# Patient Record
Sex: MALE | Race: WHITE | Employment: FULL TIME | URBAN - METROPOLITAN AREA
[De-identification: names, ages, dates, MRNs, and addresses within clinical notes are randomized per-mention and may not be internally consistent; named-entity substitution may affect disease eponyms.]

---

## 2018-10-17 ENCOUNTER — OFFICE VISIT (OUTPATIENT)
Dept: FAMILY MEDICINE | Facility: CLINIC | Age: 53
End: 2018-10-17
Payer: COMMERCIAL

## 2018-10-17 VITALS
SYSTOLIC BLOOD PRESSURE: 116 MMHG | TEMPERATURE: 97.9 F | RESPIRATION RATE: 16 BRPM | WEIGHT: 232 LBS | HEIGHT: 72 IN | HEART RATE: 68 BPM | BODY MASS INDEX: 31.42 KG/M2 | DIASTOLIC BLOOD PRESSURE: 88 MMHG

## 2018-10-17 DIAGNOSIS — K13.0 LIP LESION: ICD-10-CM

## 2018-10-17 DIAGNOSIS — Z00.00 WELL ADULT EXAM: Primary | ICD-10-CM

## 2018-10-17 LAB
CHOLEST SERPL-MCNC: 224 MG/DL
GLUCOSE SERPL-MCNC: 102 MG/DL (ref 70–99)
HDLC SERPL-MCNC: 35 MG/DL
LDLC SERPL CALC-MCNC: 157 MG/DL
NONHDLC SERPL-MCNC: 189 MG/DL
TRIGL SERPL-MCNC: 159 MG/DL

## 2018-10-17 PROCEDURE — 36415 COLL VENOUS BLD VENIPUNCTURE: CPT | Performed by: FAMILY MEDICINE

## 2018-10-17 PROCEDURE — 99386 PREV VISIT NEW AGE 40-64: CPT | Performed by: FAMILY MEDICINE

## 2018-10-17 PROCEDURE — 82947 ASSAY GLUCOSE BLOOD QUANT: CPT | Performed by: FAMILY MEDICINE

## 2018-10-17 PROCEDURE — 80061 LIPID PANEL: CPT | Performed by: FAMILY MEDICINE

## 2018-10-17 ASSESSMENT — ENCOUNTER SYMPTOMS
JOINT SWELLING: 0
MYALGIAS: 0
CONSTIPATION: 0
COUGH: 0
PALPITATIONS: 0
EYE PAIN: 0
HEMATURIA: 0
WEAKNESS: 0
HEMATOCHEZIA: 0
NAUSEA: 0
DIARRHEA: 0
FEVER: 0
PARESTHESIAS: 0
NERVOUS/ANXIOUS: 0
SHORTNESS OF BREATH: 0
SORE THROAT: 0
FREQUENCY: 0
ARTHRALGIAS: 0
DYSURIA: 0
HEARTBURN: 0
HEADACHES: 0
CHILLS: 0
ABDOMINAL PAIN: 0
DIZZINESS: 0

## 2018-10-17 ASSESSMENT — PAIN SCALES - GENERAL: PAINLEVEL: NO PAIN (0)

## 2018-10-17 NOTE — NURSING NOTE
Chief Complaint   Patient presents with     Physical       Initial /88 (BP Location: Right arm, Patient Position: Chair, Cuff Size: Adult Large)  Pulse 68  Temp 97.9  F (36.6  C) (Tympanic)  Resp 16  Ht 6' (1.829 m)  Wt 232 lb (105.2 kg)  BMI 31.46 kg/m2 Estimated body mass index is 31.46 kg/(m^2) as calculated from the following:    Height as of this encounter: 6' (1.829 m).    Weight as of this encounter: 232 lb (105.2 kg).    Patient presents to the clinic using No DME    Health Maintenance that is potentially due pending provider review:  Colonoscopy/FIT    Priscilla Perdomo MA  9:01 AM 10/17/2018  .

## 2018-10-17 NOTE — PROGRESS NOTES
SUBJECTIVE:   CC: Faustino Drew is an 52 year old male who presents for preventative health visit.     Physical   Annual:     Getting at least 3 servings of Calcium per day:  NO    Bi-annual eye exam:  NO    Dental care twice a year:  NO    Sleep apnea or symptoms of sleep apnea:  Excessive snoring    Diet:  Regular (no restrictions)    Frequency of exercise:  1 day/week    Duration of exercise:  15-30 minutes    Taking medications regularly:  Not Applicable    Additional concerns today:  No      Today's PHQ-2 Score:   PHQ-2 ( 1999 Pf[employer]) 10/17/2018   Q1: Little interest or pleasure in doing things 0   Q2: Feeling down, depressed or hopeless 0   PHQ-2 Score 0   Q1: Little interest or pleasure in doing things Not at all   Q2: Feeling down, depressed or hopeless Not at all   PHQ-2 Score 0       Abuse: Current or Past(Physical, Sexual or Emotional)- No  Do you feel safe in your environment - Yes    Social History   Substance Use Topics     Smoking status: Never Smoker     Smokeless tobacco: Not on file      Comment: Has chewed tobacco in the past.      Alcohol use Yes      Comment: 0ccasional     Alcohol Use 10/17/2018   If you drink alcohol do you typically have greater than 3 drinks per day OR greater than 7 drinks per week? No       Last PSA: No results found for: PSA    Reviewed orders with patient. Reviewed health maintenance and updated orders accordingly - Yes    Has lesion lower lip.  Dark purple, almost black.  6 months.  Non smoker, no tobacco.         Reviewed and updated as needed this visit by clinical staff         Reviewed and updated as needed this visit by Provider            Review of Systems   Constitutional: Negative for chills and fever.   HENT: Negative for congestion, ear pain, hearing loss and sore throat.    Eyes: Negative for pain and visual disturbance.   Respiratory: Negative for cough and shortness of breath.    Cardiovascular: Negative for chest pain, palpitations and peripheral edema.    Gastrointestinal: Negative for abdominal pain, constipation, diarrhea, heartburn, hematochezia and nausea.   Genitourinary: Negative for discharge, dysuria, frequency, genital sores, hematuria, impotence and urgency.   Musculoskeletal: Negative for arthralgias, joint swelling and myalgias.   Skin: Negative for rash.   Neurological: Negative for dizziness, weakness, headaches and paresthesias.   Psychiatric/Behavioral: Negative for mood changes. The patient is not nervous/anxious.          OBJECTIVE:   /88 (BP Location: Right arm, Patient Position: Chair, Cuff Size: Adult Large)  Pulse 68  Temp 97.9  F (36.6  C) (Tympanic)  Resp 16  Ht 6' (1.829 m)  Wt 232 lb (105.2 kg)  BMI 31.46 kg/m2    Physical Exam  Gen: AOx3, no acute distress  PSYCH: Affect WNL, logical thought and coherent speech   EYES: normal lids, conjunctiva.  Pupils normal. No periorbital swelling.  Neck:supple without lymphadenopathy or mass.    Throat: oroparynx clear, no exudate or tonsilar/palate asymmetry  Ears: normal TMs bilaterally.   CV: RRR, no murmur  Lungs: Clear bilaterally with good effort  Abd: nontender with no mass or organomegaly  Ext: no edema, moving all extremities  Neuro: gait normal, cranial nerves 2-12 grossly intact, symmetric strength, no sensory deficits noted  Skin: no rash .  Has 2mm dark lesion, round, monochromatic, lower L lip at border.  Doesn't suad.    GENITAL: No lesions on or around genitals.  No testicular mass, no spermatocele or epiditymits appreciated.  No hydrocele or varicocele.  No inguinal hernia.  Penis without drainage.        ASSESSMENT/PLAN:   Well exam  Lip lesion    Update labs.  Willing to do colonoscopy, info given.  Derm for lip, he doesn't like it and it's bothering him, see what they think.        Discussed risks and benefits of PSA screening with the patient, attempting to engage in shared decision making.  Discussed the high likelihood of further invasive evaluation if the PSA is  "elevated.  Discussed inability to reliably distinguish aggressive from indolent cancer on biopsy. Explained that PSA screeing and aggressive treatment for biopsy confirmed cancer may extend life in those patients harboring an aggressive form of prostate cancer, but will overtreat the overwhelming majority of men because of the high rate of indolent cancer.  We discussed that treatment of prostate cancer is not benign, and that significant side effects are a real possibility.  We discussed current USPSTF recommendations against any routine screening for prostate cancer.                 Given the above information, the patient elected not to procede with PSA screeing at this time.  He understands he can revisit the discussion at any time.       COUNSELING:   Reviewed preventive health counseling, as reflected in patient instructions       Regular exercise       Healthy diet/nutrition    BP Readings from Last 1 Encounters:   06/09/15 120/70     Estimated body mass index is 31.38 kg/(m^2) as calculated from the following:    Height as of 7/29/15: 5' 11\" (1.803 m).    Weight as of 7/29/15: 225 lb (102.1 kg).    BP Screening:   Last 3 BP Readings:    BP Readings from Last 3 Encounters:   10/17/18 116/88   06/09/15 120/70   05/28/15 125/76       The following was recommended to the patient:  Re-screen BP within a year and recommended lifestyle modifications  Weight management plan: diet/exercise       reports that he has never smoked. He does not have any smokeless tobacco history on file.          Gerber Drew MD  [facility]  "

## 2018-10-17 NOTE — MR AVS SNAPSHOT
After Visit Summary   10/17/2018    Faustino Derw    MRN: [id number]           Patient Information     Date Of Birth          1965        Visit Information        Provider Department      10/17/2018 8:40 AM Gerber Drew MD [facility]        Today's Diagnoses     Well adult exam    -  1    Lip lesion          Care Instructions      Preventive Health Recommendations  Male Ages 50 - 64    Yearly exam:             See your health care provider every year in order to  o   Review health changes.   o   Discuss preventive care.    o   Review your medicines if your doctor has prescribed any.     Have a cholesterol test every 5 years, or more frequently if you are at risk for high cholesterol/heart disease.     Have a diabetes test (fasting glucose) every three years. If you are at risk for diabetes, you should have this test more often.     Have a colonoscopy at age 50, or have a yearly FIT test (stool test). These exams will check for colon cancer.      Talk with your health care provider about whether or not a prostate cancer screening test (PSA) is right for you.    You should be tested each year for STDs (sexually transmitted diseases), if you re at risk.     Shots: Get a flu shot each year. Get a tetanus shot every 10 years.     Nutrition:    Eat at least 5 servings of fruits and vegetables daily.     Eat whole-grain bread, whole-wheat pasta and brown rice instead of white grains and rice.     Get adequate Calcium and Vitamin D.     Lifestyle    Exercise for at least 150 minutes a week (30 minutes a day, 5 days a week). This will help you control your weight and prevent disease.     Limit alcohol to one drink per day.     No smoking.     Wear sunscreen to prevent skin cancer.     See your dentist every six months for an exam and cleaning.     See your eye doctor every 1 to 2 years.            Follow-ups after your visit        Additional Services     DERMATOLOGY REFERRAL        Your provider has referred you to: [facility]: [facility] [phone]   [email or web address]    Please be aware that coverage of these services is subject to the terms and limitations of your health insurance plan.  Call member services at your health plan with any benefit or coverage questions.      Please bring the following with you to your appointment:    (1) Any X-Rays, CTs or MRIs which have been performed.  Contact the facility where they were done to arrange for  prior to your scheduled appointment.    (2) List of current medications  (3) This referral request   (4) Any documents/labs given to you for this referral            GASTROENTEROLOGY ADULT REF PROCEDURE [facility] [phone]       Last Lab Result: No results found for: CR  Body mass index is 31.46 kg/(m^2).     Needed:  No  Language:  English    Patient will be contacted to schedule procedure.     Please be aware that coverage of these services is subject to the terms and limitations of your health insurance plan.  Call member services at your health plan with any benefit or coverage questions.  Any procedures must be performed at a [facility] facility OR coordinated by your clinic's referral office.    Please bring the following with you to your appointment:    (1) Any X-Rays, CTs or MRIs which have been performed.  Contact the facility where they were done to arrange for  prior to your scheduled appointment.    (2) List of current medications   (3) This referral request   (4) Any documents/labs given to you for this referral                  Follow-up notes from your care team     Return in about 1 year (around 10/17/2019) for Routine Visit.      Who to contact     If you have questions or need follow up information about today's clinic visit or your schedule please contact [facility] directly at [phone].  Normal or non-critical lab and imaging  results will be communicated to you by MyChart, letter or phone within 4 business days after the clinic has received the results. If you do not hear from us within 7 days, please contact the clinic through MyChart or phone. If you have a critical or abnormal lab result, we will notify you by phone as soon as possible.  Submit refill requests through [employer] or call your pharmacy and they will forward the refill request to us. Please allow 3 business days for your refill to be completed.          Additional Information About Your Visit        Care EveryWhere ID     This is your Care EveryWhere ID. This could be used by other organizations to access your [facility] medical records  [id number]        Your Vitals Were     Pulse Temperature Respirations Height BMI (Body Mass Index)       68 97.9  F (36.6  C) (Tympanic) 16 6' (1.829 m) 31.46 kg/m2        Blood Pressure from Last 3 Encounters:   10/17/18 116/88   06/09/15 120/70   05/28/15 125/76    Weight from Last 3 Encounters:   10/17/18 232 lb (105.2 kg)   07/29/15 225 lb (102.1 kg)   07/01/15 225 lb (102.1 kg)              We Performed the Following     DERMATOLOGY REFERRAL     GASTROENTEROLOGY ADULT REF PROCEDURE ONLY [facility] [phone]     GLUCOSE     Lipid panel reflex to direct LDL Fasting        Primary Care Provider Office Phone # Fax #    Ned Huitron [phone] [address] [place], [place] [address]        Equal Access to Services     DERRELL [facility] Center[facility]OSTIN : Hadii rhianna Crocker, waaxda luleticiaadaha, qaybta kaalmada pat, waxay rell patel adejavi odonnell. So [facility] [place] [facility] [phone].    ATENCIÓN: Si habla español, tiene a jean disposición servicios gratuitos de asistencia lingüística. Llame al [phone].    We comply with applicable federal civil rights laws and [place] laws. We do not discriminate on the basis of race, color, national origin, age, disability, sex, sexual orientation, or gender identity.             Thank you!     Thank you for choosing [facility]  for your care. Our goal is always to provide you with excellent care. Hearing back from our patients is one way we can continue to improve our services. Please take a few minutes to complete the written survey that you may receive in the mail after your visit with us. Thank you!             Your Updated Medication List - Protect others around you: Learn how to safely use, store and throw away your medicines at www.disposemymeds.org.          This list is accurate as of 10/17/18  9:35 AM.  Always use your most recent med list.                   Brand Name Dispense Instructions for use Diagnosis    IBUPROFEN PO      Take 600 mg by mouth 3 times daily        Multi-vitamin Tabs tablet      Take 1 tablet by mouth daily

## 2019-06-11 ENCOUNTER — TELEPHONE (OUTPATIENT)
Dept: FAMILY MEDICINE | Facility: CLINIC | Age: 54
End: 2019-06-11

## 2019-06-11 NOTE — TELEPHONE ENCOUNTER
Panel Management Review        Composite cancer screening  Chart review shows that this patient is due/due soon for the following Colonoscopy  Summary:    Patient is due/failing the following:   COLONOSCOPY    Action needed:   Patient needs to complete a colonoscopy.     Type of outreach:    Phone, left message for patient to call back. Please give the patient a number to schedule a colonoscopy if he wishes to complete. The number is [phone]. Thank you.     Questions for provider review:    None                                                                                                                                    Krysta Hammond, cma

## 2021-05-27 NOTE — PROGRESS NOTES
Assessment & Plan     Hernia, epigastric  Suspected hernia, epigastric.  Patient had large bulge over epigastric region approximately 1 week ago after lifting something under a car, has improved since.  Small lump noted on exam, not painful.  Presume getting better.  No bowel concerns.  Discussed with patient monitoring and notifying provider if having any bowel concerns and will pursue ultrasound to evaluate.    Colon cancer screening  Screening, should be receiving a phone call to schedule.  - GASTROENTEROLOGY ADULT REF PROCEDURE ONLY; Future    COVID-19 virus IgG antibody test result unknown  Patient had Covid-like symptoms in January 2020, was never tested for Covid.  Was seen in clinic in [place], was tested for everything and was negative.  Would like to be tested for antibodies.  Will call with results.  - COVID-19 Ranjit RBD Siomara & Titer Reflex; Future             See Patient Instructions    Return in about 1 month (around 6/28/2021), or if symptoms worsen or fail to improve.    Earnestine Hernandez, SAHARA CNP  [facility]    Kristel Harmon is a 55 year old who presents for the following health issues     HPI      Chief Complaint   Patient presents with     Hernia     Blood Draw     ill in January and want to know if has COVID antibody     January 2020 was ill, fevers/chills, cough a little, lungs felt funny, body aches, headache, no loss of taste or smell      Concern - hernia  Onset: noticed 1 week ago  Description: upper abdominal pain and noticed lump when tightens muscle  Intensity: mild  Progression of Symptoms:  same  Accompanying Signs & Symptoms: 0  Previous history of similar problem: had hernia as a child age 14 but lower  Precipitating factors:        Worsened by: 0  Alleviating factors:        Improved by: 0  Therapies tried and outcome:  none     3 weeks to a month ago, was working under car and lifted and hurt, but didn't think anything of it.   Not painful.   Bowel  movement's normal/regular   Urinating fine   No abdominal surgeries   Seems like it's getting better already, over the last week       Review of Systems   Constitutional, HEENT, cardiovascular, pulmonary, gi and gu systems are negative, except as otherwise noted.      Objective    BP (!) 154/90   Pulse 80   Temp 97.4  F (36.3  C)   Resp 24   Wt 110 kg (242 lb 6.4 oz)   SpO2 96%   BMI 32.88 kg/m    Body mass index is 32.88 kg/m .  Physical Exam   GENERAL APPEARANCE: healthy, alert and no distress  RESP: lungs clear to auscultation - no rales, rhonchi or wheezes  CV: regular rates and rhythm, normal S1 S2, no S3 or S4 and no murmur, click or rub  ABDOMEN: soft, nontender, an approximate 3 cm x 2 cm soft lump consistent with hernia midline epigastric region without tenderness, without hepatosplenomegaly and bowel sounds normal  MS: extremities normal- no gross deformities noted  SKIN: no suspicious lesions or rashes  PSYCH: mentation appears normal and affect normal/bright    Diagnostic Test Results:  none

## 2021-05-28 ENCOUNTER — OFFICE VISIT (OUTPATIENT)
Dept: FAMILY MEDICINE | Facility: CLINIC | Age: 56
End: 2021-05-28
Payer: COMMERCIAL

## 2021-05-28 VITALS
RESPIRATION RATE: 24 BRPM | OXYGEN SATURATION: 96 % | BODY MASS INDEX: 32.88 KG/M2 | TEMPERATURE: 97.4 F | HEART RATE: 80 BPM | WEIGHT: 242.4 LBS | DIASTOLIC BLOOD PRESSURE: 90 MMHG | SYSTOLIC BLOOD PRESSURE: 154 MMHG

## 2021-05-28 DIAGNOSIS — Z01.84 COVID-19 VIRUS IGG ANTIBODY TEST RESULT UNKNOWN: ICD-10-CM

## 2021-05-28 DIAGNOSIS — Z12.11 COLON CANCER SCREENING: ICD-10-CM

## 2021-05-28 DIAGNOSIS — K43.9 HERNIA, EPIGASTRIC: Primary | ICD-10-CM

## 2021-05-28 PROCEDURE — 99213 OFFICE O/P EST LOW 20 MIN: CPT | Performed by: NURSE PRACTITIONER

## 2021-05-28 PROCEDURE — 36415 COLL VENOUS BLD VENIPUNCTURE: CPT | Performed by: NURSE PRACTITIONER

## 2021-05-28 PROCEDURE — 86769 SARS-COV-2 COVID-19 ANTIBODY: CPT | Performed by: NURSE PRACTITIONER

## 2021-05-28 NOTE — PATIENT INSTRUCTIONS
Hernia, epigastric  Suspected hernia, epigastric.  Patient had large bulge over epigastric region approximately 1 week ago after lifting something under a car, has improved since.  Small lump noted on exam, not painful.  Presume getting better.  No bowel concerns.  Discussed with patient monitoring and notifying provider if having any bowel concerns and will pursue ultrasound to evaluate.    Colon cancer screening  Screening, should be receiving a phone call to schedule.  - GASTROENTEROLOGY ADULT REF PROCEDURE ONLY; Future    COVID-19 virus IgG antibody test result unknown  Patient had Covid-like symptoms in January 2020, was never tested for Covid.  Was seen in clinic in [place], was tested for everything and was negative.  Would like to be tested for antibodies.  Will call with results.  - COVID-19 Ranjit RBD Siomara & Titer Reflex; Future

## 2021-05-29 LAB
SARS-COV-2 AB PNL SERPL IA: NEGATIVE
SARS-COV-2 IGG SERPL IA-ACNC: NORMAL

## 2022-05-12 ENCOUNTER — TELEPHONE (OUTPATIENT)
Dept: FAMILY MEDICINE | Facility: CLINIC | Age: 57
End: 2022-05-12

## 2022-05-12 ENCOUNTER — OFFICE VISIT (OUTPATIENT)
Dept: FAMILY MEDICINE | Facility: CLINIC | Age: 57
End: 2022-05-12
Payer: COMMERCIAL

## 2022-05-12 VITALS
HEART RATE: 70 BPM | SYSTOLIC BLOOD PRESSURE: 148 MMHG | OXYGEN SATURATION: 95 % | DIASTOLIC BLOOD PRESSURE: 90 MMHG | BODY MASS INDEX: 32.9 KG/M2 | HEIGHT: 71 IN | RESPIRATION RATE: 16 BRPM | WEIGHT: 235 LBS | TEMPERATURE: 97.5 F

## 2022-05-12 DIAGNOSIS — E11.9 TYPE 2 DIABETES MELLITUS WITHOUT COMPLICATION, WITHOUT LONG-TERM CURRENT USE OF INSULIN (H): ICD-10-CM

## 2022-05-12 DIAGNOSIS — R74.8 ELEVATED LIVER ENZYMES: ICD-10-CM

## 2022-05-12 DIAGNOSIS — I10 BENIGN ESSENTIAL HYPERTENSION: ICD-10-CM

## 2022-05-12 DIAGNOSIS — R25.1 TREMOR: Primary | ICD-10-CM

## 2022-05-12 LAB
ALBUMIN SERPL-MCNC: 4.1 G/DL (ref 3.4–5)
ALP SERPL-CCNC: 87 U/L (ref 40–150)
ALT SERPL W P-5'-P-CCNC: 117 U/L (ref 0–70)
ANION GAP SERPL CALCULATED.3IONS-SCNC: 7 MMOL/L (ref 3–14)
AST SERPL W P-5'-P-CCNC: 60 U/L (ref 0–45)
BILIRUB SERPL-MCNC: 0.8 MG/DL (ref 0.2–1.3)
BUN SERPL-MCNC: 15 MG/DL (ref 7–30)
CALCIUM SERPL-MCNC: 9.7 MG/DL (ref 8.5–10.1)
CHLORIDE BLD-SCNC: 102 MMOL/L (ref 94–109)
CO2 SERPL-SCNC: 26 MMOL/L (ref 20–32)
CREAT SERPL-MCNC: 0.88 MG/DL (ref 0.66–1.25)
ERYTHROCYTE [DISTWIDTH] IN BLOOD BY AUTOMATED COUNT: 12 % (ref 10–15)
GFR SERPL CREATININE-BSD FRML MDRD: >90 ML/MIN/1.73M2
GLUCOSE BLD-MCNC: 219 MG/DL (ref 70–99)
HBA1C MFR BLD: 10.1 % (ref 0–5.6)
HCT VFR BLD AUTO: 48.2 % (ref 40–53)
HGB BLD-MCNC: 16.2 G/DL (ref 13.3–17.7)
MAGNESIUM SERPL-MCNC: 2.2 MG/DL (ref 1.6–2.3)
MCH RBC QN AUTO: 29 PG (ref 26.5–33)
MCHC RBC AUTO-ENTMCNC: 33.6 G/DL (ref 31.5–36.5)
MCV RBC AUTO: 86 FL (ref 78–100)
PLATELET # BLD AUTO: 236 10E3/UL (ref 150–450)
POTASSIUM BLD-SCNC: 4.1 MMOL/L (ref 3.4–5.3)
PROT SERPL-MCNC: 8.2 G/DL (ref 6.8–8.8)
RBC # BLD AUTO: 5.58 10E6/UL (ref 4.4–5.9)
SODIUM SERPL-SCNC: 135 MMOL/L (ref 133–144)
TSH SERPL DL<=0.005 MIU/L-ACNC: 2.02 MU/L (ref 0.4–4)
WBC # BLD AUTO: 7.5 10E3/UL (ref 4–11)

## 2022-05-12 PROCEDURE — 99214 OFFICE O/P EST MOD 30 MIN: CPT | Performed by: NURSE PRACTITIONER

## 2022-05-12 PROCEDURE — 36415 COLL VENOUS BLD VENIPUNCTURE: CPT | Performed by: NURSE PRACTITIONER

## 2022-05-12 PROCEDURE — 85027 COMPLETE CBC AUTOMATED: CPT | Performed by: NURSE PRACTITIONER

## 2022-05-12 PROCEDURE — 80053 COMPREHEN METABOLIC PANEL: CPT | Performed by: NURSE PRACTITIONER

## 2022-05-12 PROCEDURE — 83036 HEMOGLOBIN GLYCOSYLATED A1C: CPT | Performed by: NURSE PRACTITIONER

## 2022-05-12 PROCEDURE — 83735 ASSAY OF MAGNESIUM: CPT | Performed by: NURSE PRACTITIONER

## 2022-05-12 PROCEDURE — 84443 ASSAY THYROID STIM HORMONE: CPT | Performed by: NURSE PRACTITIONER

## 2022-05-12 RX ORDER — METFORMIN HCL 500 MG
TABLET, EXTENDED RELEASE 24 HR ORAL
Qty: 120 TABLET | Refills: 3 | Status: SHIPPED | OUTPATIENT
Start: 2022-05-12 | End: 2023-02-15

## 2022-05-12 ASSESSMENT — PATIENT HEALTH QUESTIONNAIRE - PHQ9
SUM OF ALL RESPONSES TO PHQ QUESTIONS 1-9: 0
SUM OF ALL RESPONSES TO PHQ QUESTIONS 1-9: 0

## 2022-05-12 NOTE — PROGRESS NOTES
"  Assessment & Plan     Tremor  Left hand. This does occur at rest, this is observed in clinic today. He feels like it does improve with action. He does feel like it also occurs to the left leg. No other symptoms such as ataxia, gait difficulties. No concerning family history. Ongoing for at least a year with no significant worsening. Will refer to neuro for further evaluation.   - CBC with platelets; Future  - Comprehensive metabolic panel (BMP + Alb, Alk Phos, ALT, AST, Total. Bili, TP); Future  - Magnesium; Future  - Adult Neurology  Referral; Future  - TSH with free T4 reflex; Future      Type 2 diabetes mellitus without complication, without long-term current use of insulin (H)  Elevated glucose on CMP, A1c added and found to be 10.1%. This is a new diagnosis of diabetes. Will start metformin. Referred to diabetes education. Follow up with PCP in 3 months for recheck.   - Hemoglobin A1c; Future  - metFORMIN (GLUCOPHAGE XR) 500 MG 24 hr tablet; Take 1 tab with dinner for one week, then 1 tab twice daily for one week, then 1 tab in the morning and 2 tabs with dinner for one week, then 2 tabs in the morning and 2 tabs with dinner.  - [facility] Adult Diabetes Educator Referral; Future    Benign essential hypertension  Elevated today, elevated at visit 1 year ago. Discussed lifestyle modification at length during visit to include exercise, weight loss, sodium restriction. He is resistant to starting medication today even in light of discussion of elevated risk of heart attack and stroke. He is advised to obtain a blood pressure cuff and monitor at home, and if persistently elevated over next month, should return to clinic for treatment.    Elevated liver enyzmes  Mild elevation incidentally noted on CMP today, advised to stop ETOH, acetaminophen use, recheck in a month.       BMI:   Estimated body mass index is 32.78 kg/m  as calculated from the following:    Height as of this encounter: 1.803 m (5' 11\").    " Weight as of this encounter: 106.6 kg (235 lb).   Weight management plan: Discussed healthy diet and exercise guidelines    Patient Instructions   Labs today.  Get scheduled with neurology.    Get a blood pressure cuff and monitor your blood pressure several times per week. If staying above 140 on the top, let me know. We may need to start blood pressure medicine.    Lifestyle changes that can lower blood pressure include:  Limiting sodium in the diet.  Goal of <2.3 grams (2300 mg) daily.  Avoiding salty and prepared foods and not adding salt at the table can help.   Regular moderate exercise at least 150 minutes per week (30 minutes per day 5 days per week) plus muscle strengthening exercise at least 2 days per week.   Weight loss can help even if not dramatic or down to normal BMI range.  DASH type diet can actually lower blood pressure. You can find multiple resources for this on the internet.  Cutting back on alcohol can help for women drinking more than a drink per day and men more than 2 drinks per day on average.   Quitting smoking can help reduce your risk of heart attack or stroke.    A good resource for lifestyle changes is https://www.cardiosmart.org/topics/healthy-living        Return in about 1 month (around 6/12/2022) for worsening or continued symptoms.    SAHARA Malhotra CNP  [facility]    Kristel Harmon is a 56 year old who presents for the following health issues      Concern - Tremors   Onset: more than a month, pt states it started around the time of getting pfizer vaccine in 07/27/21.   Description: Left arm and left thigh tremor's   Intensity: severe  Progression of Symptoms:  same  Accompanying Signs & Symptoms: no  Previous history of similar problem: no  Precipitating factors:        Worsened by: na  Alleviating factors:        Improved by: na  Therapies tried and outcome:  none     Above HPI reviewed. Additionally, tremor to left hand has been present  "for the past year. Also notes has had this to the left upper leg as well. This occurs at rest, he thinks that when he is using his arm this does mostly resolve. He doesn't think this has significantly worsened over the past year. He denies any weakness, difficulty with fine or gross motor skills, ataxia, aphasia, dysphasia. No family history of tremor.       Review of Systems   Constitutional, HEENT, cardiovascular, pulmonary, gi and gu systems are negative, except as otherwise noted.      Objective    BP (!) 148/90   Pulse 70   Temp 97.5  F (36.4  C) (Tympanic)   Resp 16   Ht 1.803 m (5' 11\")   Wt 106.6 kg (235 lb)   SpO2 95%   BMI 32.78 kg/m    Body mass index is 32.78 kg/m .  Physical Exam   General Appearance:  Alert, cooperative, no distress, appears stated age. Afebrile.  Integument: Warm, dry, no rashes or lesions.  HEENT:  Head: Atraumatic, normocephalic. No cranial bone tenderness. Face nontraumatic.  Eyes: Conjunctiva clear, Lids normal. PERRL.   Nose: nares patent. No erythema of nasal mucosa. No rhinorrhea.  Neck: Supple. No Cspine tenderness.  Respiratory: No distress. Lungs clear to ausculation bilaterally. No crackles, wheezes, rhonchi or stridor.  Cardiovascular:: Regular rate, regular rhythm. No murmurs, rubs, clicks or gallops. No obvious chest wall deformities. Peripheral pulses 2+ bilaterally. No peripheral edema.  GI: Soft, nontender, normal bowel sounds. No masses, organomegaly, rigidity, or guarding.  Musculoskeletal: No swelling or erythema of extremities. Moves all extremities equally. Back: no Tspine or Lspine tenderness.   Neurologic: Alert & oriented to person, place and time. Normal tone. PERRL. Normal speech, no dysarthria.  CN II-XII grossly intact. Motor: RUE/LUE  strength 5/5 bilaterally, elbow flexion/extension 5/5 bilaterally, shoulder elevation 5/5 bilaterally. RLE/LLE knee flexion/extension 5/5 bilaterally, ankle plantar/dorsiflexion 5/5 bilaterally. No pronator drift. " Sensory: intact distally  Gait: Normal, no assistive devices. Assistance of one. Psychomotor slowing (-). Abnormal Movements (-).Rapid alternating Movements intact. Finger nose finger intact. Heel to shin normal bilaterally.  Psych: Normal mood and affect.      Results for orders placed or performed in visit on 05/12/22 (from the past 24 hour(s))   Magnesium   Result Value Ref Range    Magnesium 2.2 1.6 - 2.3 mg/dL   Comprehensive metabolic panel (BMP + Alb, Alk Phos, ALT, AST, Total. Bili, TP)   Result Value Ref Range    Sodium 135 133 - 144 mmol/L    Potassium 4.1 3.4 - 5.3 mmol/L    Chloride 102 94 - 109 mmol/L    Carbon Dioxide (CO2) 26 20 - 32 mmol/L    Anion Gap 7 3 - 14 mmol/L    Urea Nitrogen 15 7 - 30 mg/dL    Creatinine 0.88 0.66 - 1.25 mg/dL    Calcium 9.7 8.5 - 10.1 mg/dL    Glucose 219 (H) 70 - 99 mg/dL    Alkaline Phosphatase 87 40 - 150 U/L    AST 60 (H) 0 - 45 U/L     (H) 0 - 70 U/L    Protein Total 8.2 6.8 - 8.8 g/dL    Albumin 4.1 3.4 - 5.0 g/dL    Bilirubin Total 0.8 0.2 - 1.3 mg/dL    GFR Estimate >90 >60 mL/min/1.73m2   CBC with platelets   Result Value Ref Range    WBC Count 7.5 4.0 - 11.0 10e3/uL    RBC Count 5.58 4.40 - 5.90 10e6/uL    Hemoglobin 16.2 13.3 - 17.7 g/dL    Hematocrit 48.2 40.0 - 53.0 %    MCV 86 78 - 100 fL    MCH 29.0 26.5 - 33.0 pg    MCHC 33.6 31.5 - 36.5 g/dL    RDW 12.0 10.0 - 15.0 %    Platelet Count 236 150 - 450 10e3/uL   TSH with free T4 reflex   Result Value Ref Range    TSH 2.02 0.40 - 4.00 mU/L   Hemoglobin A1c   Result Value Ref Range    Hemoglobin A1C 10.1 (H) 0.0 - 5.6 %               Answers for HPI/ROS submitted by the patient on 5/12/2022  PHQ9 TOTAL SCORE: 0  How many servings of fruits and vegetables do you eat daily?: 0-1  On average, how many sweetened beverages do you drink each day (Examples: soda, juice, sweet tea, etc.  Do NOT count diet or artificially sweetened beverages)?: 1  How many minutes a day do you exercise enough to make your heart  beat faster?: 20 to 29  How many days a week do you exercise enough to make your heart beat faster?: 3 or less  How many days per week do you miss taking your medication?: 0  What is the reason for your visit today?: Tremors  When did your symptoms begin?: More than a month  What are your symptoms?: Tremors  How would you describe these symptoms?: Severe  Are your symptoms:: Staying the same  Have you had these symptoms before?: No  Is there anything that makes you feel worse?: No  Is there anything that makes you feel better?: No

## 2022-05-12 NOTE — TELEPHONE ENCOUNTER
Faustino called back . I read him Ria Khan's message. He will be waiting TO BE CONTACTED ABOUT THE DIABETES REFERRAL    Briseida Yu on 5/12/2022 at 11:49 AM

## 2022-05-12 NOTE — LETTER
May 12, 2022      Faustino Drew  [address] [facility] [address]        Dear ,    We are writing to inform you of your test results.    Thyroid hormone (overactive thyroid can sometimes cause tremor) is normal. Blood counts are normal. Magnesium, electrolytes and kidney function are normal. A couple of liver enzymes are a little elevated. If he is a heavy drinker or uses a lot of Tylenol, he should stop, and we should recheck that in a couple weeks. His blood sugar was very high, so I added on a test to look for diabetes, hemoglobin A1c, and this came back very elevated at 10.1%. This does indicate that he has diabetes. He needs to start treatment for this as soon as possible. I am going to send a prescription for metformin to his pharmacy, and he should start that as indicated on the bottle. I am also going to place a referral to diabetes education. He will need to schedule PCP follow up in 3 months for recheck.       Resulted Orders   Magnesium   Result Value Ref Range    Magnesium 2.2 1.6 - 2.3 mg/dL   Comprehensive metabolic panel (BMP + Alb, Alk Phos, ALT, AST, Total. Bili, TP)   Result Value Ref Range    Sodium 135 133 - 144 mmol/L    Potassium 4.1 3.4 - 5.3 mmol/L    Chloride 102 94 - 109 mmol/L    Carbon Dioxide (CO2) 26 20 - 32 mmol/L    Anion Gap 7 3 - 14 mmol/L    Urea Nitrogen 15 7 - 30 mg/dL    Creatinine 0.88 0.66 - 1.25 mg/dL    Calcium 9.7 8.5 - 10.1 mg/dL    Glucose 219 (H) 70 - 99 mg/dL    Alkaline Phosphatase 87 40 - 150 U/L    AST 60 (H) 0 - 45 U/L     (H) 0 - 70 U/L    Protein Total 8.2 6.8 - 8.8 g/dL    Albumin 4.1 3.4 - 5.0 g/dL    Bilirubin Total 0.8 0.2 - 1.3 mg/dL    GFR Estimate >90 >60 mL/min/1.73m2      Comment:      Effective December 21, 2021 eGFRcr in adults is calculated using the 2021 CKD-EPI creatinine equation which includes age and gender (Renny song al., NEJM, DOI: 10.1056/WSBIes5806577)   CBC with platelets   Result Value Ref Range    WBC Count 7.5 4.0 - 11.0 10e3/uL     RBC Count 5.58 4.40 - 5.90 10e6/uL    Hemoglobin 16.2 13.3 - 17.7 g/dL    Hematocrit 48.2 40.0 - 53.0 %    MCV 86 78 - 100 fL    MCH 29.0 26.5 - 33.0 pg    MCHC 33.6 31.5 - 36.5 g/dL    RDW 12.0 10.0 - 15.0 %    Platelet Count 236 150 - 450 10e3/uL   TSH with free T4 reflex   Result Value Ref Range    TSH 2.02 0.40 - 4.00 mU/L   Hemoglobin A1c   Result Value Ref Range    Hemoglobin A1C 10.1 (H) 0.0 - 5.6 %      Comment:      Normal <5.7%   Prediabetes 5.7-6.4%    Diabetes 6.5% or higher     Note: Adopted from ADA consensus guidelines.       If you have any questions or concerns, please call the clinic at the number listed above.       Sincerely,      SAHARA Licea CNP

## 2022-05-12 NOTE — PATIENT INSTRUCTIONS
Labs today.  Get scheduled with neurology.    Get a blood pressure cuff and monitor your blood pressure several times per week. If staying above 140 on the top, let me know. We may need to start blood pressure medicine.    Lifestyle changes that can lower blood pressure include:  Limiting sodium in the diet.  Goal of <2.3 grams (2300 mg) daily.  Avoiding salty and prepared foods and not adding salt at the table can help.   Regular moderate exercise at least 150 minutes per week (30 minutes per day 5 days per week) plus muscle strengthening exercise at least 2 days per week.   Weight loss can help even if not dramatic or down to normal BMI range.  DASH type diet can actually lower blood pressure. You can find multiple resources for this on the internet.  Cutting back on alcohol can help for women drinking more than a drink per day and men more than 2 drinks per day on average.   Quitting smoking can help reduce your risk of heart attack or stroke.    A good resource for lifestyle changes is https://www.cardiosmart.org/topics/healthy-living

## 2022-05-13 ASSESSMENT — PATIENT HEALTH QUESTIONNAIRE - PHQ9: SUM OF ALL RESPONSES TO PHQ QUESTIONS 1-9: 0

## 2022-05-25 ENCOUNTER — TELEPHONE (OUTPATIENT)
Dept: FAMILY MEDICINE | Facility: CLINIC | Age: 57
End: 2022-05-25
Payer: COMMERCIAL

## 2022-05-25 NOTE — TELEPHONE ENCOUNTER
Patient is called and told of the need for neurology visit as discussed with provider Bill. Mindy CRUZ RN

## 2022-05-25 NOTE — TELEPHONE ENCOUNTER
Jaison Marquez is calling back and asks again if he needs an appointment with Neurology, for his tremor?  They have tried to call him to set up an appointment, but he did not.   He is concerned about his high deductible.

## 2022-05-25 NOTE — PROGRESS NOTES
INITIAL NEUROLOGY CONSULTATION    DATE OF VISIT: 5/26/2022  CLINIC LOCATION: [facility]  MRN: [id number]  PATIENT NAME: Faustino Drew  YOB: 1965    REASON FOR VISIT:   Chief Complaint   Patient presents with     Tremors     Patient has tremor in left hand and feels it in left thigh as well      HISTORY OF PRESENT ILLNESS:                                                    Mr. Faustino Drew is 56 year old right handed male patient with past medical history of hyperlipidemia, hypertension, and diabetes mellitus type 2, who was seen today for tremor.    Per patient's report, he developed intermittent left hand resting tremor in July 2021.  He feels that it mildly improved, but still present.  He also notices mild intermittent tremor of the left leg but also has a sensation of being constantly flexed.  Did not notice any additional focal neurological symptoms.  Was seen by primary care provider and referred to neurology for evaluation.    The patient denies anosmia, acting out in sleep, constipation, bradykinesia, postural instability, micrographia, falls, or any focal neurological symptoms.  No history of head injuries, seizures, strokes and CNS infections.    Laboratory evaluation from May 2022 includes elevated ALT/AST (117/60), glucose of 219, hemoglobin A1C of 10.1, normal TSH (2.02) and CBC.  LDL was 157 in October 2018.  Was not rechecked since.    No prior brain imaging.    No additional useful information is available in Care Everywhere, which was reviewed.  PAST MEDICAL/SURGICAL HISTORY:                                                    I personally reviewed patient's past medical and surgical history with the patient at today's visit.  MEDICATIONS:                                                    I personally reviewed patient's medications and allergies with the patient at today's visit.  ALLERGIES:                                                      Allergies    Allergen Reactions     Nka [No Known Allergies]      EXAM:                                                    VITAL SIGNS:   BP (!) 145/84 (BP Location: Right arm, Patient Position: Sitting, Cuff Size: Adult Large)   Pulse 76   SpO2 95%   Mini-Cog Assessment:  Mini Cog Assessment  Clock Draw Score: 2 Normal  3 Item Recall: 3 objects recalled  Mini Cog Total Score: 5  Administered by: : Nadya BOBBY    General: pt is in NAD, cooperative.  Skin: normal turgor, moist mucous membranes, no lesions/rashes noticed.  HEENT: ATNC, EOMI, PERRL, white sclera, normal conjunctiva, no nystagmus or ptosis. No carotid bruits bilaterally.  Respiratory: lung sounds clear to auscultation bilaterally, no crackles, wheezes, rhonchi. Symmetric lung excursion, no accessory respiratory muscle use.  Cardiovascular: normal S1/S2, no murmurs/rubs/gallops.   Abdomen: Not distended.  : deferred.    Neurological:  Mental: alert, follows commands, Mini Cog Total Score: 5/5 with 3/3 on memory recall, no aphasia or dysarthria. Fund of knowledge is appropriate for age.  Cranial Nerves:  CN II: visual acuity - able to accurately count fingers with each eye. Visual fields intact, fundi: discs sharp, no papilledema and normal vessels bilaterally.  CN III, IV, VI: EOM intact, pupils equal and reactive  CN V: facial sensation nl  CN VII: face symmetric, no facial droop  CN VIII: hearing normal  CN IX: palate elevation symmetric, uvula at midline  CN XI SCM normal, shoulder shrug nl  CN XII: tongue midline  Motor: Strength: 5/5 in all major groups of all extremities. Normal tone, but mildly increases with provoking maneuvers.  There is intermittent mild left hand resting tremor and rare left leg tremor.  No other abnormal movements. No pronator drift b/l.  Reflexes: Triceps, biceps, brachioradialis, and patellar reflexes normal and symmetric, achilles reflexes are reduced bilaterally. No clonus noted. Toes are down-going b/l.   Sensory: light touch,  pinprick, and vibration intact. Romberg: negative.  Coordination: FNF and heel-shin tests intact b/l.   Gait:  Normal, able to tandem walk without difficulty.  DATA:   LABS/EEG/IMAGING/OTHER STUDIES: I reviewed pertinent medical records, as detailed in the history of present illness.  ASSESSMENT AND PLAN:      ASSESSMENT: Faustino Drew is a 56 year old male patient with listed above past medical history, who presents with left hand resting tremor since last July.    We had a detailed discussion with the patient regarding his presenting complaints.  The neurological exam today is notable for mild intermittent resting left hand and lower extremity tremor.  I discussed with the patient that his presentation is making me concerned about the beginning of Parkinson's disease versus vascular parkinsonism or Parkinson plus conditions (least likely).  Observation over time would be needed to arrive at more precise diagnosis.  Brain MRI without contrast would be helpful to exclude vascular parkinsonism.  At the present time, his symptoms are too mild to begin medication treatment, but we reviewed several options.  We also discussed option of [facility], but decided not to pursue it now.    DIAGNOSES:    ICD-10-CM    1. Tremor  R25.1 Adult Neurology  Referral     MR Brain w/o Contrast     PLAN: At today's visit we thoroughly discussed various diagnostic possibilities for patient's symptoms, necessary evaluation, and the plan, which includes:  Orders Placed This Encounter   Procedures     MR Brain w/o Contrast     No new medications.    Advised the patient to stay physically and mentally active with particular emphasis on daily mentally stimulating activities of  his choice (such as crosswords, puzzles, sudoku, etc.), stretching exercises, walking, and healthy eating.     Additional recommendations after the work-up.    Next follow-up appointment is in the next 6 months or earlier if needed.    Total Time: 71 minutes  spent on the date of the encounter doing chart review, history and exam, documentation and further activities per the note.    Ho Gibson MD  [facility]  (Chart documentation was completed in part with Dragon voice-recognition software. Even though reviewed, some grammatical, spelling, and word errors may remain.)

## 2022-05-26 ENCOUNTER — OFFICE VISIT (OUTPATIENT)
Dept: NEUROLOGY | Facility: CLINIC | Age: 57
End: 2022-05-26
Payer: COMMERCIAL

## 2022-05-26 VITALS
OXYGEN SATURATION: 97 % | WEIGHT: 221 LBS | HEART RATE: 76 BPM | HEIGHT: 71 IN | DIASTOLIC BLOOD PRESSURE: 89 MMHG | BODY MASS INDEX: 30.94 KG/M2 | SYSTOLIC BLOOD PRESSURE: 142 MMHG

## 2022-05-26 DIAGNOSIS — R25.1 TREMOR: Primary | ICD-10-CM

## 2022-05-26 PROCEDURE — 99205 OFFICE O/P NEW HI 60 MIN: CPT | Performed by: PSYCHIATRY & NEUROLOGY

## 2022-05-26 NOTE — PATIENT INSTRUCTIONS
AFTER VISIT SUMMARY (AVS):    At today's visit we thoroughly discussed various diagnostic possibilities for your symptoms, necessary evaluation, and the plan, which includes:  Orders Placed This Encounter   Procedures    MR Brain w/o Contrast     No new medications.    Stay physically and mentally active with particular emphasis on daily mentally stimulating activities of your choice (such as crosswords, puzzles, sudoku, etc.), stretching exercises, walking, and healthy eating.     Additional recommendations after the work-up.    Next follow-up appointment is in the next 6 months or earlier if needed.    Please do not hesitate to call me with any questions or concerns.    Thanks.

## 2022-05-26 NOTE — LETTER
5/26/2022         RE: Faustino Drew  [address] [id number]        Dear Colleague,    Thank you for referring your patient, Faustino Drew, to the [facility] NEUROLOGY [facility]. Please see a copy of my visit note below.    INITIAL NEUROLOGY CONSULTATION    DATE OF VISIT: 5/26/2022  CLINIC LOCATION: [facility]  MRN: [id number]  PATIENT NAME: Faustino Derw  YOB: 1965    REASON FOR VISIT:   Chief Complaint   Patient presents with     Tremors     Patient has tremor in left hand and feels it in left thigh as well      HISTORY OF PRESENT ILLNESS:                                                    Mr. Faustino Drew is 56 year old right handed male patient with past medical history of hyperlipidemia, hypertension, and diabetes mellitus type 2, who was seen today for tremor.    Per patient's report, he developed intermittent left hand resting tremor in July 2021.  He feels that it mildly improved, but still present.  He also notices mild intermittent tremor of the left leg but also has a sensation of being constantly flexed.  Did not notice any additional focal neurological symptoms.  Was seen by primary care provider and referred to neurology for evaluation.    The patient denies anosmia, acting out in sleep, constipation, bradykinesia, postural instability, micrographia, falls, or any focal neurological symptoms.  No history of head injuries, seizures, strokes and CNS infections.    Laboratory evaluation from May 2022 includes elevated ALT/AST (117/60), glucose of 219, hemoglobin A1C of 10.1, normal TSH (2.02) and CBC.  LDL was 157 in October 2018.  Was not rechecked since.    No prior brain imaging.    No additional useful information is available in Care Everywhere, which was reviewed.  PAST MEDICAL/SURGICAL HISTORY:                                                    I personally reviewed patient's past medical and surgical history with the patient at today's  visit.  MEDICATIONS:                                                    I personally reviewed patient's medications and allergies with the patient at today's visit.  ALLERGIES:                                                      Allergies   Allergen Reactions     Nka [No Known Allergies]      EXAM:                                                    VITAL SIGNS:   BP (!) 145/84 (BP Location: Right arm, Patient Position: Sitting, Cuff Size: Adult Large)   Pulse 76   SpO2 95%   Mini-Cog Assessment:  Mini Cog Assessment  Clock Draw Score: 2 Normal  3 Item Recall: 3 objects recalled  Mini Cog Total Score: 5  Administered by: : Nadya BOBBY    General: pt is in NAD, cooperative.  Skin: normal turgor, moist mucous membranes, no lesions/rashes noticed.  HEENT: ATNC, EOMI, PERRL, white sclera, normal conjunctiva, no nystagmus or ptosis. No carotid bruits bilaterally.  Respiratory: lung sounds clear to auscultation bilaterally, no crackles, wheezes, rhonchi. Symmetric lung excursion, no accessory respiratory muscle use.  Cardiovascular: normal S1/S2, no murmurs/rubs/gallops.   Abdomen: Not distended.  : deferred.    Neurological:  Mental: alert, follows commands, Mini Cog Total Score: 5/5 with 3/3 on memory recall, no aphasia or dysarthria. Fund of knowledge is appropriate for age.  Cranial Nerves:  CN II: visual acuity - able to accurately count fingers with each eye. Visual fields intact, fundi: discs sharp, no papilledema and normal vessels bilaterally.  CN III, IV, VI: EOM intact, pupils equal and reactive  CN V: facial sensation nl  CN VII: face symmetric, no facial droop  CN VIII: hearing normal  CN IX: palate elevation symmetric, uvula at midline  CN XI SCM normal, shoulder shrug nl  CN XII: tongue midline  Motor: Strength: 5/5 in all major groups of all extremities. Normal tone, but mildly increases with provoking maneuvers.  There is intermittent mild left hand resting tremor and rare left leg tremor.  No other  abnormal movements. No pronator drift b/l.  Reflexes: Triceps, biceps, brachioradialis, and patellar reflexes normal and symmetric, achilles reflexes are reduced bilaterally. No clonus noted. Toes are down-going b/l.   Sensory: light touch, pinprick, and vibration intact. Romberg: negative.  Coordination: FNF and heel-shin tests intact b/l.   Gait:  Normal, able to tandem walk without difficulty.  DATA:   LABS/EEG/IMAGING/OTHER STUDIES: I reviewed pertinent medical records, as detailed in the history of present illness.  ASSESSMENT AND PLAN:      ASSESSMENT: Faustino Drew is a 56 year old male patient with listed above past medical history, who presents with left hand resting tremor since last July.    We had a detailed discussion with the patient regarding his presenting complaints.  The neurological exam today is notable for mild intermittent resting left hand and lower extremity tremor.  I discussed with the patient that his presentation is making me concerned about the beginning of Parkinson's disease versus vascular parkinsonism or Parkinson plus conditions (least likely).  Observation over time would be needed to arrive at more precise diagnosis.  Brain MRI without contrast would be helpful to exclude vascular parkinsonism.  At the present time, his symptoms are too mild to begin medication treatment, but we reviewed several options.  We also discussed option of DaTSCAN, but decided not to pursue it now.    DIAGNOSES:    ICD-10-CM    1. Tremor  R25.1 Adult Neurology  Referral     MR Brain w/o Contrast     PLAN: At today's visit we thoroughly discussed various diagnostic possibilities for patient's symptoms, necessary evaluation, and the plan, which includes:  Orders Placed This Encounter   Procedures     MR Brain w/o Contrast     No new medications.    Advised the patient to stay physically and mentally active with particular emphasis on daily mentally stimulating activities of  his choice (such as  "crosswords, puzzles, sudoku, etc.), stretching exercises, walking, and healthy eating.     Additional recommendations after the work-up.    Next follow-up appointment is in the next 6 months or earlier if needed.    Total Time: 71 minutes spent on the date of the encounter doing chart review, history and exam, documentation and further activities per the note.    Ho Gibson MD  [facility] Neurology  (Chart documentation was completed in part with Dragon voice-recognition software. Even though reviewed, some grammatical, spelling, and word errors may remain.)            Faustino Drew is a 56 year old male who presents for:  Chief Complaint   Patient presents with     Tremors     Patient has tremor in left hand and feels it in left thigh as well         Initial Vitals:  BP (!) 145/84 (BP Location: Right arm, Patient Position: Sitting, Cuff Size: Adult Large)   Pulse 76   SpO2 95%  Estimated body mass index is 32.78 kg/m  as calculated from the following:    Height as of 5/12/22: 1.803 m (5' 11\").    Weight as of 5/12/22: 106.6 kg (235 lb).. There is no height or weight on file to calculate BSA. BP completed using cuff size: regular    Nursing Comments: 2nd set of vitals     Nadya Fowler      Again, thank you for allowing me to participate in the care of your patient.        Sincerely,        Ho Gibson MD    "

## 2022-05-26 NOTE — PROGRESS NOTES
"Faustino Drew is a 56 year old male who presents for:  Chief Complaint   Patient presents with     Tremors     Patient has tremor in left hand and feels it in left thigh as well         Initial Vitals:  BP (!) 145/84 (BP Location: Right arm, Patient Position: Sitting, Cuff Size: Adult Large)   Pulse 76   SpO2 95%  Estimated body mass index is 32.78 kg/m  as calculated from the following:    Height as of 5/12/22: 1.803 m (5' 11\").    Weight as of 5/12/22: 106.6 kg (235 lb).. There is no height or weight on file to calculate BSA. BP completed using cuff size: regular    Nursing Comments: 2nd set of vitals taken on right arm while sitting with Large cuff:  BP- 142/89, Pulse- 76, O2- 97    Nadya Fowler  "

## 2022-06-06 ENCOUNTER — HOSPITAL ENCOUNTER (OUTPATIENT)
Dept: MRI IMAGING | Facility: CLINIC | Age: 57
Discharge: HOME | End: 2022-06-06
Attending: PSYCHIATRY & NEUROLOGY | Admitting: PSYCHIATRY & NEUROLOGY
Payer: COMMERCIAL

## 2022-06-06 ENCOUNTER — VIRTUAL VISIT (OUTPATIENT)
Dept: EDUCATION SERVICES | Facility: CLINIC | Age: 57
End: 2022-06-06
Attending: NURSE PRACTITIONER
Payer: COMMERCIAL

## 2022-06-06 DIAGNOSIS — E11.9 TYPE 2 DIABETES MELLITUS WITHOUT COMPLICATION, WITHOUT LONG-TERM CURRENT USE OF INSULIN (H): Primary | ICD-10-CM

## 2022-06-06 DIAGNOSIS — R25.1 TREMOR: ICD-10-CM

## 2022-06-06 PROCEDURE — 70551 MRI BRAIN STEM W/O DYE: CPT

## 2022-06-06 PROCEDURE — G0108 DIAB MANAGE TRN  PER INDIV: HCPCS | Mod: 95 | Performed by: DIETITIAN, REGISTERED

## 2022-06-06 RX ORDER — BLOOD-GLUCOSE METER
1 EACH MISCELLANEOUS ONCE
Qty: 1 KIT | Refills: 0 | Status: SHIPPED | OUTPATIENT
Start: 2022-06-06 | End: 2022-06-06

## 2022-06-06 RX ORDER — LANCETS
100 EACH MISCELLANEOUS 2 TIMES DAILY
Qty: 100 EACH | Refills: 11 | Status: SHIPPED | OUTPATIENT
Start: 2022-06-06

## 2022-06-06 NOTE — PATIENT INSTRUCTIONS
"Goals for Diabetes:    1. Check blood sugars 1-2 times each day at varying times: before meals, after meals and bedtime  Blood Glucose Targets:  -Fasting and before meal target is 80 - 130  -2 hours after a meal target is < 180  Always remember to bring meter and/or log book to all appointments.    2.  Activity really helps improve blood sugars. Try to Incorporate 30 minutes activity into each day - does not need to be all at one time & walking counts!    3. It would be ok to add back some high quality cho to your diet to help balance your meals. Focus on \"high quality\" carbohydrates (whole grains, starchy vegetables,fruits, beans/legumes,etc). Limit added sugar as much as possible, read labels to find this, will see \"includes #grams added sugar. Goal daily intake <25g for women and <35g for men.    4. Take diabetes medications as prescribed     5. If you are overweight aim for a 5% weight loss, this will improve blood sugars, cholesterol, and blood pressure!     6.  Continue following with your primary care provider, get A1C checked end of July or after.     Follow up with your Diabetic Educator as needed to assess BG targets and need for modifications to medications and/or lifestyle.    Call or Mychart with any questions.      Thank you!  Milana Thomas RD, LD, [facility]  Certified Diabetes Care &   Outpatient [facility] Diabetes Education and Nutrition Services for the [facility]:  For Your Diabetes Education or Nutrition Appointments Call:  [phone]   For Diabetes Education and Nutrition Related Questions:   Phone: [phone]  Send [employer] Message   If you need a medication refill please contact your pharmacy. Please allow 3 business days for your refills to be completed.     "

## 2022-06-06 NOTE — PROGRESS NOTES
Type of Service: Telephone Visit    Originating Location (Patient Location): Home  Distant Location (Provider Location): Home  Mode of Communication:  Telephone    Telephone Visit Start Time: 11:00  Telephone Visit End Time (telephone visit stop time): 11:45    How would patient like to obtain AVS? Mail a copy      Diabetes Self-Management Education & Support    Presents for: Initial Assessment for new diagnosis    Type of Visit: Telephone Visit    How would patient like to obtain AVS? Mail a copy    ASSESSMENT:  Faustino has a new DM diagnosis. He went to provider appt for tremors, labs were run and A1C 10.1%. Since this time he has completely changed his diet, he is not eating any cho only meat and vegetables. Generally eats 1 meal per day and is working out most days. He has lost 23lbs. He is taking 1000mg Metformin, he plans to continue with this dose until BG <100, then will try without and see if he can manage BG without as this is what he prefers. BG have improved significantly. Did some education see below. No plans for f/u at this time.    Patient's most recent   Lab Results   Component Value Date    A1C 10.1 05/12/2022    is not meeting goal of <7.0    Diabetes knowledge and skills assessment:   Patient is knowledgeable in diabetes management concepts related to: being active, healthy eating    Continue education with the following diabetes management concepts: Healthy Eating, Being Active, Monitoring, Taking Medication, Problem Solving, Reducing Risks and Healthy Coping    Based on learning assessment above, most appropriate setting for further diabetes education would be: Individual setting.    INTERVENTIONS:    Education provided today on:  AADE Self-Care Behaviors:  Diabetes Pathophysiology  Healthy Eating: weight reduction, portion control, plate planning method and discussed that overtime he may get tired of eating only meat/vegatables and it is ok to have some cho, this also will help to round out  his diet, discussed healthy cho options  Being Active: relationship to blood glucose and describe appropriate activity program  Monitoring: purpose, individual blood glucose targets, frequency of monitoring and will order him a glucometer   Taking Medication: action of prescribed medication and when to take medications. Discussed that we often times like to see people stay on Metformin d/t benefits but if he is able to get off of it and BG are controlled this is OK too.  Reducing Risks: major complications of diabetes, appropriate dental care, annual eye exam and A1C - goals, relating to blood glucose levels, how often to check    Opportunities for ongoing education and support in diabetes-self management were discussed. Pt verbalized understanding of concepts discussed and recommendations provided today.       Education Materials Provided:-will send in mail   [employer] Healthy Living with Diabetes Book      PLAN  1. Continue monitoring BG  2. Continue with diet and exercise, can consider adding back some healthy carbohydrates (fruits/whole grains/etc.)  3. Continue regular f/u with PCP would recommend A1C recheck 7/25/22 or later (3 months after previous check)    Follow-up: no f/u planned at this time, Faustino will reach out if needed.     See Goals Section for co-developed, patient-stated behavior change goals.  AVS provided to patient today.          SUBJECTIVE / OBJECTIVE:  Presents for: Initial Assessment for new diagnosis  Accompanied by: Self  Diabetes education in the past 24mo: No  Diabetes type: Type 2  Date of diagnosis: 5/2022  Disease course: Improving  Other concerns:: None  Cultural Influences/Ethnic Background:  Choose not to answer    Diabetes Symptoms & Complications:   Patient Problem List and Family Medical History reviewed for relevant medical history, current medical status, and diabetes risk factors.    Vitals:  There were no vitals taken for this visit.  Estimated body mass index is  "30.82 kg/m  as calculated from the following:    Height as of 5/26/22: 1.803 m (5' 11\").    Weight as of 5/26/22: 100.2 kg (221 lb).   Last 3 BP:   BP Readings from Last 3 Encounters:   05/26/22 (!) 142/89   05/12/22 (!) 148/90   05/28/21 (!) 154/90       History   Smoking Status     Never Smoker   Smokeless Tobacco     Former User     Comment: Has chewed tobacco in the past.        Labs:  Lab Results   Component Value Date    A1C 10.1 05/12/2022     Lab Results   Component Value Date     05/12/2022     10/17/2018     Lab Results   Component Value Date     10/17/2018     HDL Cholesterol   Date Value Ref Range Status   10/17/2018 35 (L) >39 mg/dL Final   ]  GFR Estimate   Date Value Ref Range Status   05/12/2022 >90 >60 mL/min/1.73m2 Final     Comment:     Effective December 21, 2021 eGFRcr in adults is calculated using the 2021 CKD-EPI creatinine equation which includes age and gender (Renny song al., NEJ, DOI: 10.1056/KPEKff5912829)     No results found for: GFRESTBLACK  Lab Results   Component Value Date    CR 0.88 05/12/2022     No results found for: MICROALBUMIN    Healthy Eating:  Healthy Eating Assessed Today: Yes  Meal planning/habits: Low carb  Meals include: Other  Other: Has changed diet dramatically- 1 meal per day- protein + vegetables  Beverages: Water  Has patient met with a dietitian in the past?: No    Being Active:  Being Active Assessed Today: Yes  Exercise:: Yes  Days per week of moderate to strenuous exercise (like a brisk walk): 5 (jogging, biking,)  How intense was your typical exercise? : Heavy (like jogging or swimming  Barrier to exercise: None    Monitoring:  Monitoring Assessed Today: Yes  Did patient bring glucose meter to appointment? : Yes  Blood glucose trend: No change    Glucose data:  He bought a glucometer from the store. Reports his numbers were in the 200s but are improving.  Last few readings:99, 108, 115    Taking Medications:  Diabetes Medication(s)     " Biguanides       metFORMIN (GLUCOPHAGE XR) 500 MG 24 hr tablet    Take 1 tab with dinner for one week, then 1 tab twice daily for one week, then 1 tab in the morning and 2 tabs with dinner for one week, then 2 tabs in the morning and 2 tabs with dinner.          Taking Medication Assessed Today: Yes  Current Treatments: Oral Medication (taken by mouth)  Problems taking diabetes medications regularly?: Yes  Diabetes medication side effects?: No    Problem Solving:  Problem Solving Assessed Today: No              Reducing Risks:  Reducing Risks Assessed Today: Yes  Diabetes Risks: Age over 45 years, Hyperlipidemia  CAD Risks: Diabetes Mellitus, Hypertension  Has dilated eye exam at least once a year?: No  Sees dentist every 6 months?: No    Healthy Coping:  Emotional response to diabetes: Confidence diabetes can be controlled  Informal Support system:: None  Stage of change: ACTION (Actively working towards change)  Patient Activation Measure Survey Score:  No flowsheet data found.      Milana Thomas RD, LD, [facility]      Time Spent: 30+ minutes  Encounter Type: Individual        Any diabetes medication dose changes were made via the Certified Diabetes Care & Education Protocol in collaboration with the patient's primary care provider. A copy of this encounter was shared with the provider.

## 2022-06-06 NOTE — LETTER
6/6/2022         RE: Faustino Drew  [address] [facility] [address]        Dear Colleague,    Thank you for referring your patient, Faustino Drew, to the [facility]. Please see a copy of my visit note below.    Type of Service: Telephone Visit    Originating Location (Patient Location): Home  Distant Location (Provider Location): Home  Mode of Communication:  Telephone    Telephone Visit Start Time: 11:00  Telephone Visit End Time (telephone visit stop time): 11:45    How would patient like to obtain AVS? Mail a copy      Diabetes Self-Management Education & Support    Presents for: Initial Assessment for new diagnosis    Type of Visit: Telephone Visit    How would patient like to obtain AVS? Mail a copy    ASSESSMENT:  Faustino has a new Dm diagnosis. He went to provider appt for tremors, labs were run and A1C 10.1%. Since this time he has completely changed his diet, he is not eating any cho only meat and vegetables. Generally eats 1 meal per day and is working out most days. He has lost 23lbs. He is taking 1000mg Metformin, he plans to continue with this dose until BG <100, then will try without and see if he can manage BG without medications as this is what he prefers. BG have improved significantly.    Patient's most recent   Lab Results   Component Value Date    A1C 10.1 05/12/2022    is not meeting goal of <7.0    Diabetes knowledge and skills assessment:   Patient is knowledgeable in diabetes management concepts related to: being active, healthy eating    Continue education with the following diabetes management concepts: Healthy Eating, Being Active, Monitoring, Taking Medication, Problem Solving, Reducing Risks and Healthy Coping    Based on learning assessment above, most appropriate setting for further diabetes education would be: Individual setting.    INTERVENTIONS:    Education provided today on:  AADE Self-Care Behaviors:  Diabetes Pathophysiology  Healthy Eating: weight  reduction, portion control, plate planning method and discussed that overtime he may get tired of eating only meat/vegatables and it is ok to have some cho, this also will help to round out his diet, discussed healthy cho options  Being Active: relationship to blood glucose and describe appropriate activity program  Monitoring: purpose, individual blood glucose targets, frequency of monitoring and will order him a glucometer   Taking Medication: action of prescribed medication and when to take medications. Discussed that we often times like to see people stay on Metformin d/t benefits but if he is able to get off of it and BG are controlled this is OK too.  Reducing Risks: major complications of diabetes, appropriate dental care, annual eye exam and A1C - goals, relating to blood glucose levels, how often to check    Opportunities for ongoing education and support in diabetes-self management were discussed. Pt verbalized understanding of concepts discussed and recommendations provided today.       Education Materials Provided:-will send in mail  [employer] Healthy Living with Diabetes Book      PLAN  1. Continue monitoring BG  2. Continue with diet and exercise, can consider adding back some healthy carbohydrates (fruits/whole grains/etc.)  3. Continue regular f/u with PCP would recommend A1C recheck 7/25/22 or later (3 months after previous check)    Follow-up: no f/u planned at this time, Faustino will reach out if needed.     See Goals Section for co-developed, patient-stated behavior change goals.  AVS provided to patient today.          SUBJECTIVE / OBJECTIVE:  Presents for: Initial Assessment for new diagnosis  Accompanied by: Self  Diabetes education in the past 24mo: No  Diabetes type: Type 2  Date of diagnosis: 5/2022  Disease course: Improving  Other concerns:: None  Cultural Influences/Ethnic Background:  Choose not to answer  ***    Diabetes Symptoms & Complications:          Patient Problem List  "and Family Medical History reviewed for relevant medical history, current medical status, and diabetes risk factors.    Vitals:  There were no vitals taken for this visit.  Estimated body mass index is 30.82 kg/m  as calculated from the following:    Height as of 5/26/22: 1.803 m (5' 11\").    Weight as of 5/26/22: 100.2 kg (221 lb).   Last 3 BP:   BP Readings from Last 3 Encounters:   05/26/22 (!) 142/89   05/12/22 (!) 148/90   05/28/21 (!) 154/90       History   Smoking Status     Never Smoker   Smokeless Tobacco     Former User     Comment: Has chewed tobacco in the past.        Labs:  Lab Results   Component Value Date    A1C 10.1 05/12/2022     Lab Results   Component Value Date     05/12/2022     10/17/2018     Lab Results   Component Value Date     10/17/2018     HDL Cholesterol   Date Value Ref Range Status   10/17/2018 35 (L) >39 mg/dL Final   ]  GFR Estimate   Date Value Ref Range Status   05/12/2022 >90 >60 mL/min/1.73m2 Final     Comment:     Effective December 21, 2021 eGFRcr in adults is calculated using the 2021 CKD-EPI creatinine equation which includes age and gender (Renny et al., NEJ, DOI: 10.1056/XEJHmj9807392)     No results found for: GFRESTBLACK  Lab Results   Component Value Date    CR 0.88 05/12/2022     No results found for: MICROALBUMIN    Healthy Eating:  Healthy Eating Assessed Today: Yes  Meal planning/habits: Low carb  Meals include: Other  Other: Has changed diet dramatically- 1 meal per day- protein + vegetables  Beverages: Water  Has patient met with a dietitian in the past?: No    Being Active:  Being Active Assessed Today: Yes  Exercise:: Yes  Days per week of moderate to strenuous exercise (like a brisk walk): 5 (jogging, biking,)  How intense was your typical exercise? : Heavy (like jogging or swimming  Barrier to exercise: None    Monitoring:  Monitoring Assessed Today: Yes  Did patient bring glucose meter to appointment? : Yes  Blood glucose trend: No " change    Glucose data:  ***  Date Breakfast  Lunch  Dinner  Bedtime    Before After Before After Before After    *** *** *** *** *** *** *** ***   *** *** *** *** *** *** *** ***   *** *** *** *** *** *** *** ***   *** *** *** *** *** *** *** ***   *** *** *** *** *** *** *** ***   *** *** *** *** *** *** *** ***   *** *** *** *** *** *** *** ***       Taking Medications:  Diabetes Medication(s)     Biguanides       metFORMIN (GLUCOPHAGE XR) 500 MG 24 hr tablet    Take 1 tab with dinner for one week, then 1 tab twice daily for one week, then 1 tab in the morning and 2 tabs with dinner for one week, then 2 tabs in the morning and 2 tabs with dinner.          Taking Medication Assessed Today: Yes  Current Treatments: Oral Medication (taken by mouth)  Problems taking diabetes medications regularly?: Yes  Diabetes medication side effects?: No    Problem Solving:  Problem Solving Assessed Today: No              Reducing Risks:  Reducing Risks Assessed Today: Yes  Diabetes Risks: Age over 45 years, Hyperlipidemia  CAD Risks: Diabetes Mellitus, Hypertension  Has dilated eye exam at least once a year?: No  Sees dentist every 6 months?: No    Healthy Coping:  Emotional response to diabetes: Confidence diabetes can be controlled  Informal Support system:: None  Stage of change: ACTION (Actively working towards change)  Patient Activation Measure Survey Score:  No flowsheet data found.          ***  Time Spent: {cde time spent:047673} minutes  Encounter Type: Individual        Any diabetes medication dose changes were made via the Certified Diabetes Care & Education Protocol in collaboration with the patient's {diabetes education provider list:563529}. A copy of this encounter was shared with the provider.        BG-99, 108, 115

## 2022-08-17 ENCOUNTER — TELEPHONE (OUTPATIENT)
Dept: FAMILY MEDICINE | Facility: CLINIC | Age: 57
End: 2022-08-17

## 2022-08-17 NOTE — TELEPHONE ENCOUNTER
Patient Quality Outreach    Patient is due for the following:   Diabetes -  A1C, LDL (Fasting), BP Check, Diabetic Follow-Up Visit and Foot Exam    Next Steps:   Schedule a office visit for diabetes    Type of outreach:    Sent Rendeevoo message.    Next Steps:  Reach out within 90 days via Rendeevoo.    Max number of attempts reached: No. Will try again in 90 days if patient still on fail list.    Questions for provider review:    None     Clarita Marquez, [facility]  Chart routed to Care Team.

## 2022-08-21 ENCOUNTER — HEALTH MAINTENANCE LETTER (OUTPATIENT)
Age: 57
End: 2022-08-21

## 2022-08-24 ENCOUNTER — TELEPHONE (OUTPATIENT)
Dept: FAMILY MEDICINE | Facility: CLINIC | Age: 57
End: 2022-08-24

## 2022-08-24 DIAGNOSIS — E11.9 DIABETES MELLITUS, TYPE 2 (H): ICD-10-CM

## 2022-08-24 NOTE — LETTER
[facility]  [address] [place] [id number]  Phone: [phone]  Fax: [phone]    August 24, 2022      Faustino Drew  [facility] [address]            Dear Faustino Drew:    This is to remind you that your provider wanted you to return to the clinic for lab test(s).  Urine micro albumin, A1c, Lipids  If you are coming in for Lipids and/or Glucose testing please fast for 10-12 hours. Morning medications can be taken with water.    You may call [phone] to schedule a lab only appointment at [place] or [place].     On review of your electronic health record, your last blood pressure check was elevated.  BP Readings from Last 3 Encounters:   05/26/22 (!) 142/89   05/12/22 (!) 148/90   05/28/21 (!) 154/90     Please schedule a free nurse only appointment at your nearest [facility] to have your blood pressure checked. You may also have your Blood Pressure checked at a [facility] pharmacy.        Sincerely,        Earnestine SHOEMAKER-DIANNE/ lidia

## 2022-08-24 NOTE — TELEPHONE ENCOUNTER
Patient Quality Outreach    Patient is due for the following:   Diabetes -  A1C, Microalbumin and BP Check    Next Steps:   Schedule a nurse only visit for BP lab only visit for labs    Type of outreach:    Sent letter.    Next Steps:  Reach out within 90 days via Phone.  attempted to reach by phone, no answer. Letter mailed    Max number of attempts reached: No. Will try again in 90 days if patient still on fail list.    Questions for provider review:    None     Clarita Marquez, [facility]  Chart routed to Care Team.

## 2022-09-22 ENCOUNTER — LAB (OUTPATIENT)
Dept: LAB | Facility: CLINIC | Age: 57
End: 2022-09-22
Payer: COMMERCIAL

## 2022-09-22 DIAGNOSIS — E11.9 DIABETES MELLITUS, TYPE 2 (H): ICD-10-CM

## 2022-09-22 LAB
CHOLEST SERPL-MCNC: 174 MG/DL
CREAT UR-MCNC: 312.2 MG/DL
HBA1C MFR BLD: 6 % (ref 0–5.6)
HDLC SERPL-MCNC: 40 MG/DL
LDLC SERPL CALC-MCNC: 110 MG/DL
MICROALBUMIN UR-MCNC: 143 MG/L
MICROALBUMIN/CREAT UR: 45.8 MG/G CR (ref 0–17)
NONHDLC SERPL-MCNC: 134 MG/DL
TRIGL SERPL-MCNC: 119 MG/DL

## 2022-09-22 PROCEDURE — 83036 HEMOGLOBIN GLYCOSYLATED A1C: CPT

## 2022-09-22 PROCEDURE — 80061 LIPID PANEL: CPT

## 2022-09-22 PROCEDURE — 36415 COLL VENOUS BLD VENIPUNCTURE: CPT

## 2022-09-22 PROCEDURE — 82043 UR ALBUMIN QUANTITATIVE: CPT

## 2022-09-24 ENCOUNTER — MYC MEDICAL ADVICE (OUTPATIENT)
Dept: FAMILY MEDICINE | Facility: CLINIC | Age: 57
End: 2022-09-24

## 2022-09-24 DIAGNOSIS — E11.9 TYPE 2 DIABETES MELLITUS WITHOUT COMPLICATION, WITHOUT LONG-TERM CURRENT USE OF INSULIN (H): Primary | ICD-10-CM

## 2022-09-26 RX ORDER — LISINOPRIL 10 MG/1
10 TABLET ORAL DAILY
Qty: 90 TABLET | Refills: 0 | Status: SHIPPED | OUTPATIENT
Start: 2022-09-26 | End: 2022-11-01

## 2022-09-28 ENCOUNTER — TELEPHONE (OUTPATIENT)
Dept: FAMILY MEDICINE | Facility: CLINIC | Age: 57
End: 2022-09-28

## 2022-09-28 NOTE — TELEPHONE ENCOUNTER
Patient Quality Outreach    Patient is due for the following:   Hypertension -  BP check    Next Steps:   Schedule a nurse only visit for bp    Type of outreach:    Sent EyesBot message.    Next Steps:  Reach out within 90 days via EyesBot.    Max number of attempts reached: No. Will try again in 90 days if patient still on fail list.    Questions for provider review:    None     Clarita Marquez, [facility]  Chart routed to Care Team.

## 2022-10-29 ENCOUNTER — MYC MEDICAL ADVICE (OUTPATIENT)
Dept: FAMILY MEDICINE | Facility: CLINIC | Age: 57
End: 2022-10-29

## 2022-10-29 DIAGNOSIS — E11.9 TYPE 2 DIABETES MELLITUS WITHOUT COMPLICATION, WITHOUT LONG-TERM CURRENT USE OF INSULIN (H): ICD-10-CM

## 2022-11-01 RX ORDER — LISINOPRIL 20 MG/1
20 TABLET ORAL DAILY
Qty: 90 TABLET | Refills: 0 | Status: SHIPPED | OUTPATIENT
Start: 2022-11-01 | End: 2022-12-01

## 2022-11-21 ENCOUNTER — HEALTH MAINTENANCE LETTER (OUTPATIENT)
Age: 57
End: 2022-11-21

## 2022-11-23 ENCOUNTER — OFFICE VISIT (OUTPATIENT)
Dept: NEUROLOGY | Facility: CLINIC | Age: 57
End: 2022-11-23
Payer: COMMERCIAL

## 2022-11-23 VITALS
WEIGHT: 211 LBS | HEIGHT: 71 IN | HEART RATE: 71 BPM | DIASTOLIC BLOOD PRESSURE: 76 MMHG | SYSTOLIC BLOOD PRESSURE: 122 MMHG | BODY MASS INDEX: 29.54 KG/M2 | OXYGEN SATURATION: 97 %

## 2022-11-23 DIAGNOSIS — R25.1 TREMOR: Primary | ICD-10-CM

## 2022-11-23 DIAGNOSIS — R90.89 ABNORMAL BRAIN MRI: ICD-10-CM

## 2022-11-23 PROCEDURE — 99215 OFFICE O/P EST HI 40 MIN: CPT | Performed by: PSYCHIATRY & NEUROLOGY

## 2022-11-23 NOTE — PROGRESS NOTES
"Faustino Drew is a 57 year old male who presents for:  Chief Complaint   Patient presents with     Follow Up     Tremor- Patient reports they are doing well         Initial Vitals:  /76 (BP Location: Right arm, Patient Position: Sitting, Cuff Size: Adult Regular)   Pulse 71   Ht 1.803 m (5' 11\")   Wt 95.7 kg (211 lb)   SpO2 97%   BMI 29.43 kg/m   Estimated body mass index is 29.43 kg/m  as calculated from the following:    Height as of this encounter: 1.803 m (5' 11\").    Weight as of this encounter: 95.7 kg (211 lb).. Body surface area is 2.19 meters squared. BP completed using cuff size: merle Fowler    "

## 2022-11-23 NOTE — LETTER
11/23/2022         RE: Faustino Drew  [address] [facility] [address]        Dear Colleague,    Thank you for referring your patient, Faustino Drew, to the [facility]. Please see a copy of my visit note below.    ESTABLISHED PATIENT NEUROLOGY NOTE    DATE OF VISIT: 11/23/2022  CLINIC LOCATION: [facility]  MRN: [id number]  PATIENT NAME: Faustino Drew  YOB: 1965    REASON FOR VISIT:   Chief Complaint   Patient presents with     Follow Up     Tremor- Patient reports they are doing well      SUBJECTIVE:                                                      HISTORY OF PRESENT ILLNESS: Patient is here to follow up regarding left hand/leg resting tremor. Please refer to my initial note from 5/26/2022 for further information.    Since the last visit, the patient reports that his left hand tremor significantly improved and left lower extremity tremor/symptoms are resolved.  He denies interval development of new focal neurological symptoms.    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  3 months follow-up to ensure stability was advised by neuroradiologist.  No other abnormalities were seen.  Images were personally reviewed and independently interpreted.  EXAM:                                                    Physical Exam:   Vitals: /76 (BP Location: Right arm, Patient Position: Sitting, Cuff Size: Adult Regular)   Pulse 71   SpO2 97%     General: pt is in NAD, cooperative.  Skin: normal turgor, moist mucous membranes, no lesions/rashes noticed.  HEENT: ATNC, white sclera, normal conjunctiva.  Respiratory: Symmetric lung excursion, no accessory respiratory muscle use.  Abdomen: Non distended.  Neurological: awake, cooperative, follows commands, no aphasia or dysarthria noted, cranial nerves II-XII: no ptosis, extraocular motility is full, face is symmetric,  tongue is midline, equally moves all extremities, he has mild and rare occasional left hand tremor, tone is normal at rest, but increases with provoking maneuvers bilaterally, finger tapping and foot tapping slightly slower on the left, casual gait is normal.  ASSESSMENT AND PLAN:                                                    Assessment: 57 year old male patient presents for follow-up of left hand/leg resting tremor.  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  3 months follow-up to ensure stability was advised, but the patient did not do it yet.  He is open to do it now, and I placed the order.    Regarding his tremor, it is stable currently.  I would like to continue monitoring it with the next follow-up visit in 1 year.    Diagnoses:    ICD-10-CM    1. Tremor  R25.1       2. Abnormal brain MRI  R90.89         Plan: At today's visit we thoroughly discussed various current symptoms, MRI results, necessary evaluation, and the plan, which includes:  Orders Placed This Encounter   Procedures     MR Brain w/o Contrast     No new medications.    Next follow-up appointment is in the next 1 year or earlier if needed.    Total Time: 41 minutes spent on the date of the encounter doing chart review, history and exam, documentation and further activities per the note.  Extra time was needed to answer numerous patient questions and to discuss his plan of care.    Ho Gibson MD  [facility]  (Chart documentation was completed in part with Dragon voice-recognition software. Even though reviewed, some grammatical, spelling, and word errors may remain.)      Faustino Drew is a 57 year old male who presents for:  Chief Complaint   Patient presents with     Follow Up     Tremor- Patient reports they are doing well         Initial Vitals:  /76 (BP Location: Right arm, Patient Position: Sitting, Cuff Size: Adult Regular)   Pulse 71  "  Ht 1.803 m (5' 11\")   Wt 95.7 kg (211 lb)   SpO2 97%   BMI 29.43 kg/m   Estimated body mass index is 29.43 kg/m  as calculated from the following:    Height as of this encounter: 1.803 m (5' 11\").    Weight as of this encounter: 95.7 kg (211 lb).. Body surface area is 2.19 meters squared. BP completed using cuff size: merle Fowler        Again, thank you for allowing me to participate in the care of your patient.        Sincerely,        Ho Gibson MD    "

## 2022-11-23 NOTE — PATIENT INSTRUCTIONS
AFTER VISIT SUMMARY (AVS):    At today's visit we thoroughly discussed various current symptoms, MRI results, necessary evaluation, and the plan, which includes:  Orders Placed This Encounter   Procedures    MR Brain w/o Contrast     No new medications.    Next follow-up appointment is in the next 1 year or earlier if needed.    Please do not hesitate to call me with any questions or concerns.    Thanks.

## 2022-11-26 ENCOUNTER — HOSPITAL ENCOUNTER (OUTPATIENT)
Dept: MRI IMAGING | Facility: CLINIC | Age: 57
Discharge: HOME | End: 2022-11-26
Attending: PSYCHIATRY & NEUROLOGY | Admitting: PSYCHIATRY & NEUROLOGY
Payer: COMMERCIAL

## 2022-11-26 DIAGNOSIS — R90.89 ABNORMAL BRAIN MRI: ICD-10-CM

## 2022-11-26 PROCEDURE — 255N000002 HC RX 255 OP 636: Performed by: PSYCHIATRY & NEUROLOGY

## 2022-11-26 PROCEDURE — A9585 GADOBUTROL INJECTION: HCPCS | Performed by: PSYCHIATRY & NEUROLOGY

## 2022-11-26 PROCEDURE — 70553 MRI BRAIN STEM W/O & W/DYE: CPT

## 2022-11-26 RX ORDER — GADOBUTROL 604.72 MG/ML
9.5 INJECTION INTRAVENOUS ONCE
Status: COMPLETED | OUTPATIENT
Start: 2022-11-26 | End: 2022-11-26

## 2022-11-26 RX ADMIN — GADOBUTROL 9.5 ML: 604.72 INJECTION INTRAVENOUS at 11:45

## 2022-12-01 ENCOUNTER — MYC REFILL (OUTPATIENT)
Dept: FAMILY MEDICINE | Facility: CLINIC | Age: 57
End: 2022-12-01

## 2022-12-01 DIAGNOSIS — E11.9 TYPE 2 DIABETES MELLITUS WITHOUT COMPLICATION, WITHOUT LONG-TERM CURRENT USE OF INSULIN (H): ICD-10-CM

## 2022-12-01 RX ORDER — LISINOPRIL 20 MG/1
20 TABLET ORAL DAILY
Qty: 90 TABLET | Refills: 0 | Status: SHIPPED | OUTPATIENT
Start: 2022-12-01 | End: 2023-01-06

## 2022-12-06 ENCOUNTER — TELEPHONE (OUTPATIENT)
Dept: FAMILY MEDICINE | Facility: CLINIC | Age: 57
End: 2022-12-06

## 2022-12-06 NOTE — TELEPHONE ENCOUNTER
Patient Quality Outreach    Patient is due for the following:   Diabetes -  Foot Exam  Colon Cancer Screening  Physical Annual Wellness Visit    Next Steps:   Schedule a Annual Wellness Visit    Type of outreach:    Sent trivago message.    Next Steps:  Reach out within 90 days via trivago.    Max number of attempts reached: No. Will try again in 90 days if patient still on fail list.    Questions for provider review:    None     Clarita Marquez, [facility]  Chart routed to Care Team.

## 2022-12-25 ENCOUNTER — HEALTH MAINTENANCE LETTER (OUTPATIENT)
Age: 57
End: 2022-12-25

## 2023-01-06 ENCOUNTER — MYC REFILL (OUTPATIENT)
Dept: FAMILY MEDICINE | Facility: CLINIC | Age: 58
End: 2023-01-06

## 2023-01-06 DIAGNOSIS — E11.9 TYPE 2 DIABETES MELLITUS WITHOUT COMPLICATION, WITHOUT LONG-TERM CURRENT USE OF INSULIN (H): ICD-10-CM

## 2023-01-06 RX ORDER — LISINOPRIL 20 MG/1
20 TABLET ORAL DAILY
Qty: 30 TABLET | Refills: 0 | Status: SHIPPED | OUTPATIENT
Start: 2023-01-06 | End: 2023-02-15

## 2023-01-06 NOTE — TELEPHONE ENCOUNTER
Prescription approved per [facility] Refill Protocol.  Julie Behrendt RN

## 2023-02-10 ENCOUNTER — MYC REFILL (OUTPATIENT)
Dept: FAMILY MEDICINE | Facility: CLINIC | Age: 58
End: 2023-02-10
Payer: COMMERCIAL

## 2023-02-10 DIAGNOSIS — E11.9 TYPE 2 DIABETES MELLITUS WITHOUT COMPLICATION, WITHOUT LONG-TERM CURRENT USE OF INSULIN (H): ICD-10-CM

## 2023-02-13 RX ORDER — LISINOPRIL 20 MG/1
20 TABLET ORAL DAILY
Qty: 30 TABLET | Refills: 0 | OUTPATIENT
Start: 2023-02-13

## 2023-02-13 NOTE — TELEPHONE ENCOUNTER
Per last fill notes appt needed please call to schedule and let the team know if consideration of a bridge fill to scheduled appt is needed.

## 2023-02-14 ASSESSMENT — ENCOUNTER SYMPTOMS
SORE THROAT: 0
NAUSEA: 0
COUGH: 0
HEMATOCHEZIA: 0
FEVER: 0
CONSTIPATION: 0
DYSURIA: 0
HEARTBURN: 0
HEADACHES: 0
HEMATURIA: 0
EYE PAIN: 0
DIZZINESS: 0
WEAKNESS: 0
FREQUENCY: 0
PARESTHESIAS: 0
ABDOMINAL PAIN: 0
JOINT SWELLING: 0
DIARRHEA: 0
NERVOUS/ANXIOUS: 0
CHILLS: 0
ARTHRALGIAS: 0
MYALGIAS: 0
PALPITATIONS: 0
SHORTNESS OF BREATH: 0

## 2023-02-15 ENCOUNTER — OFFICE VISIT (OUTPATIENT)
Dept: FAMILY MEDICINE | Facility: CLINIC | Age: 58
End: 2023-02-15
Payer: COMMERCIAL

## 2023-02-15 VITALS
WEIGHT: 208 LBS | HEIGHT: 71 IN | DIASTOLIC BLOOD PRESSURE: 88 MMHG | TEMPERATURE: 97.3 F | BODY MASS INDEX: 29.12 KG/M2 | RESPIRATION RATE: 14 BRPM | OXYGEN SATURATION: 99 % | HEART RATE: 76 BPM | SYSTOLIC BLOOD PRESSURE: 134 MMHG

## 2023-02-15 DIAGNOSIS — Z00.01 ENCOUNTER FOR ROUTINE ADULT PHYSICAL EXAM WITH ABNORMAL FINDINGS: Primary | ICD-10-CM

## 2023-02-15 DIAGNOSIS — Z12.11 SCREEN FOR COLON CANCER: ICD-10-CM

## 2023-02-15 DIAGNOSIS — Z11.59 NEED FOR HEPATITIS C SCREENING TEST: ICD-10-CM

## 2023-02-15 DIAGNOSIS — E11.9 TYPE 2 DIABETES MELLITUS WITHOUT COMPLICATION, WITHOUT LONG-TERM CURRENT USE OF INSULIN (H): ICD-10-CM

## 2023-02-15 DIAGNOSIS — E78.5 HYPERLIPIDEMIA LDL GOAL <70: ICD-10-CM

## 2023-02-15 DIAGNOSIS — I10 BENIGN ESSENTIAL HYPERTENSION: ICD-10-CM

## 2023-02-15 LAB
ANION GAP SERPL CALCULATED.3IONS-SCNC: 13 MMOL/L (ref 7–15)
BUN SERPL-MCNC: 15.8 MG/DL (ref 6–20)
CALCIUM SERPL-MCNC: 9.7 MG/DL (ref 8.6–10)
CHLORIDE SERPL-SCNC: 104 MMOL/L (ref 98–107)
CREAT SERPL-MCNC: 0.91 MG/DL (ref 0.67–1.17)
DEPRECATED HCO3 PLAS-SCNC: 25 MMOL/L (ref 22–29)
GFR SERPL CREATININE-BSD FRML MDRD: >90 ML/MIN/1.73M2
GLUCOSE SERPL-MCNC: 122 MG/DL (ref 70–99)
HBA1C MFR BLD: 6 % (ref 0–5.6)
LDLC SERPL DIRECT ASSAY-MCNC: 112 MG/DL
POTASSIUM SERPL-SCNC: 4.1 MMOL/L (ref 3.4–5.3)
SODIUM SERPL-SCNC: 142 MMOL/L (ref 136–145)

## 2023-02-15 PROCEDURE — 80048 BASIC METABOLIC PNL TOTAL CA: CPT | Performed by: NURSE PRACTITIONER

## 2023-02-15 PROCEDURE — 83721 ASSAY OF BLOOD LIPOPROTEIN: CPT | Performed by: NURSE PRACTITIONER

## 2023-02-15 PROCEDURE — 99213 OFFICE O/P EST LOW 20 MIN: CPT | Mod: 25 | Performed by: NURSE PRACTITIONER

## 2023-02-15 PROCEDURE — 99396 PREV VISIT EST AGE 40-64: CPT | Performed by: NURSE PRACTITIONER

## 2023-02-15 PROCEDURE — 83036 HEMOGLOBIN GLYCOSYLATED A1C: CPT | Performed by: NURSE PRACTITIONER

## 2023-02-15 PROCEDURE — 36415 COLL VENOUS BLD VENIPUNCTURE: CPT | Performed by: NURSE PRACTITIONER

## 2023-02-15 RX ORDER — METFORMIN HCL 500 MG
TABLET, EXTENDED RELEASE 24 HR ORAL
Qty: 180 TABLET | Refills: 1 | Status: SHIPPED | OUTPATIENT
Start: 2023-02-15 | End: 2023-12-17

## 2023-02-15 RX ORDER — LISINOPRIL 20 MG/1
20 TABLET ORAL DAILY
Qty: 90 TABLET | Refills: 3 | Status: SHIPPED | OUTPATIENT
Start: 2023-02-15 | End: 2024-07-11

## 2023-02-15 ASSESSMENT — ENCOUNTER SYMPTOMS
FEVER: 0
NAUSEA: 0
SHORTNESS OF BREATH: 0
PARESTHESIAS: 0
EYE PAIN: 0
ABDOMINAL PAIN: 0
HEMATURIA: 0
DYSURIA: 0
MYALGIAS: 0
PALPITATIONS: 0
DIZZINESS: 0
JOINT SWELLING: 0
HEADACHES: 0
ARTHRALGIAS: 0
DIARRHEA: 0
FREQUENCY: 0
HEMATOCHEZIA: 0
CONSTIPATION: 0
SORE THROAT: 0
WEAKNESS: 0
NERVOUS/ANXIOUS: 0
COUGH: 0
HEARTBURN: 0
CHILLS: 0

## 2023-02-15 ASSESSMENT — PAIN SCALES - GENERAL: PAINLEVEL: NO PAIN (0)

## 2023-02-15 NOTE — PROGRESS NOTES
SUBJECTIVE:   CC: Faustino is an 57 year old who presents for preventative health visit.     Healthy Habits:     Getting at least 3 servings of Calcium per day:  NO    Bi-annual eye exam:  NO    Dental care twice a year:  Yes    Sleep apnea or symptoms of sleep apnea:  Excessive snoring    Diet:  Low salt and Diabetic    Duration of exercise:  30-45 minutes    Taking medications regularly:  Yes    Medication side effects:  None    PHQ-2 Total Score: 0    Additional concerns today:  No      Diabetes Follow-up:  Diagnosed May/2022    How often are you checking your blood sugar? Two times daily  Blood sugar testing frequency justification:  Uncontrolled diabetes  What time of day are you checking your blood sugars (select all that apply)?  Before and after meals  Have you had any blood sugars above 200?  No  Have you had any blood sugars below 70?  No    What symptoms do you notice when your blood sugar is low?  None    What concerns do you have today about your diabetes? None     Do you have any of these symptoms? (Select all that apply)  No numbness or tingling in feet.  No redness, sores or blisters on feet.  No complaints of excessive thirst.  No reports of blurry vision.  No significant changes to weight.    Have you had a diabetic eye exam in the last 12 months? No    Weight loss of 30 lbs  Was down to 204  Good BP control at goal < 140/90    New diagnosis May/2022    Taking metformin once a day and felt better.. taking once a night  Blood sugar 140-160   Has been down     Eye exam due  Dental exam     Numbness none  Cold when getting into bed  Non smoker      No recent eye exam  Currently not on an aspirin or a statin      BP Readings from Last 2 Encounters:   02/15/23 134/88   11/23/22 122/76     Hemoglobin A1C (%)   Date Value   09/22/2022 6.0 (H)   05/12/2022 10.1 (H)     LDL Cholesterol Calculated (mg/dL)   Date Value   09/22/2022 110 (H)   10/17/2018 157 (H)     LDL Cholesterol Direct (mg/dL)   Date Value    10/17/2013 136 (H)           Today's PHQ-2 Score:   PHQ-2 ( 1999 [employer]) 2/14/2023   Q1: Little interest or pleasure in doing things 0   Q2: Feeling down, depressed or hopeless 0   PHQ-2 Score 0   PHQ-2 Total Score (12-17 Years)- Positive if 3 or more points; Administer PHQ-A if positive -   Q1: Little interest or pleasure in doing things Not at all   Q2: Feeling down, depressed or hopeless Not at all   PHQ-2 Score 0       Have you ever done Advance Care Planning? (For example, a Health Directive, POLST, or a discussion with a medical provider or your loved ones about your wishes):     Social History     Tobacco Use     Smoking status: Never     Smokeless tobacco: Former     Tobacco comments:     Has chewed tobacco in the past.    Substance Use Topics     Alcohol use: Yes     Comment: 0ccasional         Alcohol Use 2/14/2023   Prescreen: >3 drinks/day or >7 drinks/week? No   Prescreen: >3 drinks/day or >7 drinks/week? -       Last PSA: No results found for: PSA    Reviewed orders with patient. Reviewed health maintenance and updated orders accordingly - Yes  Labs reviewed in EPIC  BP Readings from Last 3 Encounters:   02/15/23 134/88   11/23/22 122/76   05/26/22 (!) 142/89    Wt Readings from Last 3 Encounters:   02/15/23 94.3 kg (208 lb)   11/23/22 95.7 kg (211 lb)   05/26/22 100.2 kg (221 lb)                  Patient Active Problem List   Diagnosis     Hyperlipidemia with target LDL less than 130     Diabetes mellitus, type 2 (H)     Benign essential hypertension     Past Surgical History:   Procedure Laterality Date     OPEN REDUCTION INTERNAL FIXATION CALCANEOUS Right 6/9/2015    Procedure: OPEN REDUCTION INTERNAL FIXATION CALCANEOUS;  Surgeon: Haroldo Kennedy DPM;  Location: [facility]     TONSILLECTOMY         Social History     Tobacco Use     Smoking status: Never     Smokeless tobacco: Former     Tobacco comments:     Has chewed tobacco in the past.    Substance Use Topics     Alcohol use: Yes      Comment: 0ccasional     Family History   Problem Relation Age of Onset     Diabetes Mother      Hypertension Mother      C.A.D. No family hx of      Cancer - colorectal No family hx of      Prostate Cancer No family hx of          Current Outpatient Medications   Medication Sig Dispense Refill     CONTOUR NEXT TEST test strip Use to test blood sugar 1-2 times daily. 200 strip 11     IBUPROFEN PO Take 600 mg by mouth 3 times daily       lisinopril (ZESTRIL) 20 MG tablet Take 1 tablet (20 mg) by mouth daily 90 tablet 3     metFORMIN (GLUCOPHAGE XR) 500 MG 24 hr tablet Take 2 tablets with dinner 180 tablet 1     Microlet Lancets MISC 100 each 2 times daily Use to test blood sugar 2x daily. 100 each 11     multivitamin w/minerals (THERA-VIT-M) tablet Take 1 tablet by mouth daily       Allergies   Allergen Reactions     Nka [No Known Allergies]      Recent Labs   Lab Test 02/15/23  1552 09/22/22  1426 05/12/22  0729 10/17/18  0929   A1C 6.0* 6.0* 10.1*  --    * 110*  --  157*   HDL  --  40  --  35*   TRIG  --  119  --  159*   ALT  --   --  117*  --    CR 0.91  --  0.88  --    GFRESTIMATED >90  --  >90  --    POTASSIUM 4.1  --  4.1  --    TSH  --   --  2.02  --         Reviewed and updated as needed this visit by clinical staff   Tobacco  Allergies  Meds              Reviewed and updated as needed this visit by Provider                 Past Medical History:   Diagnosis Date     Benign essential hypertension 5/12/2022     Diabetes mellitus, type 2 (H) 5/12/2022     Hyperlipidemia with target LDL less than 130 10/23/2013    Diagnosis updated by automated process. Provider to review and confirm.      Past Surgical History:   Procedure Laterality Date     OPEN REDUCTION INTERNAL FIXATION CALCANEOUS Right 6/9/2015    Procedure: OPEN REDUCTION INTERNAL FIXATION CALCANEOUS;  Surgeon: Haroldo Kennedy DPM;  Location: [facility]     TONSILLECTOMY         Review of Systems   Constitutional: Negative for chills and fever.  "  HENT: Negative for congestion, ear pain, hearing loss and sore throat.    Eyes: Negative for pain and visual disturbance.   Respiratory: Negative for cough and shortness of breath.    Cardiovascular: Negative for chest pain, palpitations and peripheral edema.   Gastrointestinal: Negative for abdominal pain, constipation, diarrhea, heartburn, hematochezia and nausea.   Genitourinary: Negative for dysuria, frequency, genital sores, hematuria, impotence, penile discharge and urgency.   Musculoskeletal: Negative for arthralgias, joint swelling and myalgias.   Skin: Negative for rash.   Neurological: Negative for dizziness, weakness, headaches and paresthesias.   Psychiatric/Behavioral: Negative for mood changes. The patient is not nervous/anxious.          OBJECTIVE:   /88   Pulse 76   Temp 97.3  F (36.3  C) (Tympanic)   Resp 14   Ht 1.803 m (5' 11\")   Wt 94.3 kg (208 lb)   SpO2 99%   BMI 29.01 kg/m      Physical Exam  GENERAL: healthy, alert and no distress  EYES: Eyes grossly normal to inspection, PERRL and conjunctivae and sclerae normal  HENT: ear canals and TM's normal, nose and mouth without ulcers or lesions  NECK: no adenopathy, no asymmetry, masses, or scars and thyroid normal to palpation  RESP: lungs clear to auscultation - no rales, rhonchi or wheezes  CV: regular rate and rhythm, normal S1 S2, no S3 or S4, no murmur, click or rub, no peripheral edema and peripheral pulses strong  ABDOMEN: soft, nontender, no hepatosplenomegaly, no masses and bowel sounds normal  MS: no gross musculoskeletal defects noted, no edema  SKIN: no suspicious lesions or rashes  NEURO: Normal strength and tone, mentation intact and speech normal  PSYCH: mentation appears normal, affect normal/bright  Diabetic foot exam: normal DP and PT pulses, no trophic changes or ulcerative lesions and normal sensory exam    Diagnostic Test Results:  Labs reviewed in Epic  Results for orders placed or performed in visit on 02/15/23 "   HEMOGLOBIN A1C     Status: Abnormal   Result Value Ref Range    Hemoglobin A1C 6.0 (H) 0.0 - 5.6 %   Basic metabolic panel  (Ca, Cl, CO2, Creat, Gluc, K, Na, BUN)     Status: Abnormal   Result Value Ref Range    Sodium 142 136 - 145 mmol/L    Potassium 4.1 3.4 - 5.3 mmol/L    Chloride 104 98 - 107 mmol/L    Carbon Dioxide (CO2) 25 22 - 29 mmol/L    Anion Gap 13 7 - 15 mmol/L    Urea Nitrogen 15.8 6.0 - 20.0 mg/dL    Creatinine 0.91 0.67 - 1.17 mg/dL    Calcium 9.7 8.6 - 10.0 mg/dL    Glucose 122 (H) 70 - 99 mg/dL    GFR Estimate >90 >60 mL/min/1.73m2   LDL cholesterol direct     Status: Abnormal   Result Value Ref Range    LDL Cholesterol Direct 112 (H) <100 mg/dL       ASSESSMENT/PLAN:   Faustino was seen today for diabetes and physical.    Diagnoses and all orders for this visit:    Encounter for routine adult physical exam with abnormal findings    Type 2 diabetes mellitus without complication, without long-term current use of insulin (H)  Meeting goal.  Continue metformin with dinner   Consider adding on a statin and an aspirin for preventative  Diabetic educator consultation as needed  -     HEMOGLOBIN A1C; Future  -     lisinopril (ZESTRIL) 20 MG tablet; Take 1 tablet (20 mg) by mouth daily  -     metFORMIN (GLUCOPHAGE XR) 500 MG 24 hr tablet; Take 2 tablets with dinner  -     HEMOGLOBIN A1C    Hyperlipidemia LDL goal <70  Labs done to monitor today  We will contact with results would become available  Consider adding in a statin and aspirin    Benign essential hypertension  Meeting goal  Continue lisinopril  -     Basic metabolic panel  (Ca, Cl, CO2, Creat, Gluc, K, Na, BUN); Future  -     Basic metabolic panel  (Ca, Cl, CO2, Creat, Gluc, K, Na, BUN)    Screen for colon cancer  -     COLOGUARD(EXACT SCIENCES); Future          Other orders  -     REVIEW OF HEALTH MAINTENANCE PROTOCOL ORDERS        Patient has been advised of split billing requirements and indicates understanding: Yes      COUNSELING:  "  Reviewed preventive health counseling, as reflected in patient instructions      BMI:   Estimated body mass index is 29.01 kg/m  as calculated from the following:    Height as of this encounter: 1.803 m (5' 11\").    Weight as of this encounter: 94.3 kg (208 lb).         He reports that he has never smoked. He has quit using smokeless tobacco.    Call or return to the clinic with any worsening of symptoms or no resolution. Patient/Parent verbalized understanding and is in agreement. Medication side effects reviewed.   Current Outpatient Medications   Medication Sig Dispense Refill     CONTOUR NEXT TEST test strip Use to test blood sugar 1-2 times daily. 200 strip 11     IBUPROFEN PO Take 600 mg by mouth 3 times daily       lisinopril (ZESTRIL) 20 MG tablet Take 1 tablet (20 mg) by mouth daily 90 tablet 3     metFORMIN (GLUCOPHAGE XR) 500 MG 24 hr tablet Take 2 tablets with dinner 180 tablet 1     Microlet Lancets MISC 100 each 2 times daily Use to test blood sugar 2x daily. 100 each 11     multivitamin w/minerals (THERA-VIT-M) tablet Take 1 tablet by mouth daily       Chart documentation with Dragon Voice recognition Software. Although reviewed after completion, some words and grammatical errors may remain.    SAHARA Anderson CNP  [facility]  "

## 2023-03-15 ENCOUNTER — LAB (OUTPATIENT)
Dept: FAMILY MEDICINE | Facility: CLINIC | Age: 58
End: 2023-03-15
Payer: COMMERCIAL

## 2023-03-15 DIAGNOSIS — Z12.11 SCREEN FOR COLON CANCER: ICD-10-CM

## 2023-04-08 LAB — NONINV COLON CA DNA+OCC BLD SCRN STL QL: NEGATIVE

## 2023-06-02 ENCOUNTER — HEALTH MAINTENANCE LETTER (OUTPATIENT)
Age: 58
End: 2023-06-02

## 2023-06-19 ENCOUNTER — LAB (OUTPATIENT)
Dept: LAB | Facility: CLINIC | Age: 58
End: 2023-06-19
Payer: COMMERCIAL

## 2023-06-19 DIAGNOSIS — E11.9 DIABETES MELLITUS, TYPE 2 (H): Primary | ICD-10-CM

## 2023-06-19 LAB — HBA1C MFR BLD: 5.7 % (ref 0–5.6)

## 2023-06-19 PROCEDURE — 36415 COLL VENOUS BLD VENIPUNCTURE: CPT

## 2023-06-19 PROCEDURE — 83036 HEMOGLOBIN GLYCOSYLATED A1C: CPT

## 2023-11-17 ENCOUNTER — TELEPHONE (OUTPATIENT)
Dept: NEUROLOGY | Facility: CLINIC | Age: 58
End: 2023-11-17
Payer: COMMERCIAL

## 2023-11-20 ENCOUNTER — OFFICE VISIT (OUTPATIENT)
Dept: NEUROLOGY | Facility: CLINIC | Age: 58
End: 2023-11-20
Payer: COMMERCIAL

## 2023-11-20 VITALS — DIASTOLIC BLOOD PRESSURE: 82 MMHG | SYSTOLIC BLOOD PRESSURE: 130 MMHG | HEART RATE: 82 BPM | OXYGEN SATURATION: 98 %

## 2023-11-20 DIAGNOSIS — R25.1 TREMOR: Primary | ICD-10-CM

## 2023-11-20 DIAGNOSIS — R90.89 ABNORMAL BRAIN MRI: ICD-10-CM

## 2023-11-20 PROCEDURE — 99214 OFFICE O/P EST MOD 30 MIN: CPT | Performed by: PSYCHIATRY & NEUROLOGY

## 2023-11-20 RX ORDER — AMANTADINE HYDROCHLORIDE 100 MG/1
CAPSULE, GELATIN COATED ORAL
Qty: 180 CAPSULE | Refills: 1 | Status: SHIPPED | OUTPATIENT
Start: 2023-11-20 | End: 2023-12-04

## 2023-11-20 NOTE — PROGRESS NOTES
ESTABLISHED PATIENT NEUROLOGY NOTE    DATE OF VISIT: 11/20/2023  CLINIC LOCATION: [facility]  MRN: [id number]  PATIENT NAME: Faustino Drew  YOB: 1965    REASON FOR VISIT:   Chief Complaint   Patient presents with    Follow Up     Tremor- increased some     SUBJECTIVE:                                                      HISTORY OF PRESENT ILLNESS: Patient is here to follow up regarding left-sided tremor/abnormal brain MRI.  The last visit was on 11/23/2022.  Please refer to my initial/other prior notes for further information.    Since the last visit, the patient reports worsening of his right hand tremor that affects his ability to hunt and type.  He denies interval development of new focal neurological symptoms.    Brain MRI with and without contrast from 11/26/2022 did not demonstrate any changes in size of susceptibility focus in the right medial cerebellum, felt to represent microhemorrhage/microcalcifications versus small cavernoma.  Images were personally reviewed and independently interpreted.  EXAM:                                                    Physical Exam:   Vitals: /82 (BP Location: Left arm, Patient Position: Sitting, Cuff Size: Adult Regular)   Pulse 82   SpO2 98%     General: pt is in NAD, cooperative.  Skin: normal turgor, moist mucous membranes, no lesions/rashes noticed.  HEENT: ATNC, white sclera, normal conjunctiva.  Respiratory: Symmetric lung excursion, no accessory respiratory muscle use.  Abdomen: Non distended.  Neurological: awake, cooperative, follows commands, no exam changes compared to the initial visit.  ASSESSMENT AND PLAN:                                                    Assessment: 57 year old male patient presents for follow-up of left hand/leg resting tremor and abnormal brain MRI.  The patient reports worsening of his tremor.  He completed the recommended brain MRI in November 2022, which was stable.    Regarding his brain MRI  abnormality, it appears to be stable and not concerning at this point.  We will monitor, but I do not believe that we need to repeat his brain MRI currently.    Regarding his left-sided tremor, we discussed that it is most likely consistent with his tremor predominant Parkinson's disease.  He is interested in trying a medication for it, and we discussed different treatment options.  We decided to try amantadine.  I reviewed side effects, including feet edema, orthostatic hypotension, dizziness, confusion, nausea, diarrhea, livido reticularis, and dry mouth.  The patient will contact my clinic with any intolerable side effects.      Diagnoses:    ICD-10-CM    1. Tremor  R25.1       2. Abnormal brain MRI  R90.89         Plan: At today's visit we thoroughly discussed current symptoms, evaluation results, available treatment options, and the plan.    We decided to try amantadine.  I advised the patient to take 100 mg in the evening for the first week.  Then, increase it to 100 mg twice daily if tolerated.  I asked him to contact my clinic with any intolerable side effects.  In such case, we could consider a different medication.    No need for continued MRI surveillance given the stability of his MRI changes..    Next follow-up appointment is in the next 6 months or earlier if needed.    Total Time: 31 minutes spent on the date of the encounter doing chart review, history and exam, documentation and further activities per the note.    Ho Gibson MD  [facility] Neurology  (Chart documentation was completed in part with Dragon voice-recognition software. Even though reviewed, some grammatical, spelling, and word errors may remain.)

## 2023-11-20 NOTE — PATIENT INSTRUCTIONS
AFTER VISIT SUMMARY (AVS):    At today's visit we thoroughly discussed current symptoms, evaluation results, available treatment options, and the plan.    We decided to try amantadine.  Please take 100 mg in the evening for the first week.  Then, increase it to 100 mg twice daily if tolerated.  Please contact my clinic with any intolerable side effects.  In such case, we could consider a different medication.    No need for continued MRI surveillance given the stability of your MRI changes..    Next follow-up appointment is in the next 6 months or earlier if needed.    Please do not hesitate to call me with any questions or concerns.    Thanks.

## 2023-11-20 NOTE — LETTER
11/20/2023         RE: Faustino Drew  [address] [address]        Dear Colleague,    Thank you for referring your patient, Faustino Drew, to the [facility]. Please see a copy of my visit note below.    ESTABLISHED PATIENT NEUROLOGY NOTE    DATE OF VISIT: 11/20/2023  CLINIC LOCATION: [facility]  MRN: [id number]  PATIENT NAME: Faustino Drew  YOB: 1965    REASON FOR VISIT:   Chief Complaint   Patient presents with     Follow Up     Tremor- increased some     SUBJECTIVE:                                                      HISTORY OF PRESENT ILLNESS: Patient is here to follow up regarding left-sided tremor/abnormal brain MRI.  The last visit was on 11/23/2022.  Please refer to my initial/other prior notes for further information.    Since the last visit, the patient reports worsening of his right hand tremor that affects his ability to hunt and type.  He denies interval development of new focal neurological symptoms.    Brain MRI with and without contrast from 11/26/2022 did not demonstrate any changes in size of susceptibility focus in the right medial cerebellum, felt to represent microhemorrhage/microcalcifications versus small cavernoma.  Images were personally reviewed and independently interpreted.  EXAM:                                                    Physical Exam:   Vitals: /82 (BP Location: Left arm, Patient Position: Sitting, Cuff Size: Adult Regular)   Pulse 82   SpO2 98%     General: pt is in NAD, cooperative.  Skin: normal turgor, moist mucous membranes, no lesions/rashes noticed.  HEENT: ATNC, white sclera, normal conjunctiva.  Respiratory: Symmetric lung excursion, no accessory respiratory muscle use.  Abdomen: Non distended.  Neurological: awake, cooperative, follows commands, no exam changes compared to the initial visit.  ASSESSMENT AND PLAN:                                                    Assessment: 57  year old male patient presents for follow-up of left hand/leg resting tremor and abnormal brain MRI.  The patient reports worsening of his tremor.  He completed the recommended brain MRI in November 2022, which was stable.    Regarding his brain MRI abnormality, it appears to be stable and not concerning at this point.  We will monitor, but I do not believe that we need to repeat his brain MRI currently.    Regarding his left-sided tremor, we discussed that it is most likely consistent with his tremor predominant Parkinson's disease.  He is interested in trying a medication for it, and we discussed different treatment options.  We decided to try amantadine.  I reviewed side effects, including feet edema, orthostatic hypotension, dizziness, confusion, nausea, diarrhea, livido reticularis, and dry mouth.  The patient will contact my clinic with any intolerable side effects.      Diagnoses:    ICD-10-CM    1. Tremor  R25.1       2. Abnormal brain MRI  R90.89         Plan: At today's visit we thoroughly discussed current symptoms, evaluation results, available treatment options, and the plan.    We decided to try amantadine.  I advised the patient to take 100 mg in the evening for the first week.  Then, increase it to 100 mg twice daily if tolerated.  I asked him to contact my clinic with any intolerable side effects.  In such case, we could consider a different medication.    No need for continued MRI surveillance given the stability of his MRI changes..    Next follow-up appointment is in the next 6 months or earlier if needed.    Total Time: 31 minutes spent on the date of the encounter doing chart review, history and exam, documentation and further activities per the note.    Ho Gibson MD  [facility] Neurology  (Chart documentation was completed in part with Dragon voice-recognition software. Even though reviewed, some grammatical, spelling, and word errors may remain.)    Faustino Drew is a 57  "year old male who presents for:  Chief Complaint   Patient presents with     Follow Up     Tremor- increased some        Initial Vitals:  /82 (BP Location: Left arm, Patient Position: Sitting, Cuff Size: Adult Regular)   Pulse 82   SpO2 98%  Estimated body mass index is 29.01 kg/m  as calculated from the following:    Height as of 2/15/23: 1.803 m (5' 11\").    Weight as of 2/15/23: 94.3 kg (208 lb).. There is no height or weight on file to calculate BSA. BP completed using cuff size: regular    Nadya Fowler       Again, thank you for allowing me to participate in the care of your patient.        Sincerely,        Ho Gibson MD  "

## 2023-11-20 NOTE — PROGRESS NOTES
"Faustino Drew is a 57 year old male who presents for:  Chief Complaint   Patient presents with    Follow Up     Tremor- increased some        Initial Vitals:  /82 (BP Location: Left arm, Patient Position: Sitting, Cuff Size: Adult Regular)   Pulse 82   SpO2 98%  Estimated body mass index is 29.01 kg/m  as calculated from the following:    Height as of 2/15/23: 1.803 m (5' 11\").    Weight as of 2/15/23: 94.3 kg (208 lb).. There is no height or weight on file to calculate BSA. BP completed using cuff size: regular    Nadya Fowler   "

## 2023-11-26 ENCOUNTER — HEALTH MAINTENANCE LETTER (OUTPATIENT)
Age: 58
End: 2023-11-26

## 2023-12-16 ENCOUNTER — LAB (OUTPATIENT)
Dept: LAB | Facility: CLINIC | Age: 58
End: 2023-12-16
Payer: COMMERCIAL

## 2023-12-16 DIAGNOSIS — E11.9 DIABETES MELLITUS, TYPE 2 (H): Primary | ICD-10-CM

## 2023-12-16 LAB
CHOLEST SERPL-MCNC: 195 MG/DL
FASTING STATUS PATIENT QL REPORTED: YES
HBA1C MFR BLD: 5.7 % (ref 0–5.6)
HDLC SERPL-MCNC: 44 MG/DL
LDLC SERPL CALC-MCNC: 109 MG/DL
NONHDLC SERPL-MCNC: 151 MG/DL
TRIGL SERPL-MCNC: 212 MG/DL

## 2023-12-16 PROCEDURE — 83036 HEMOGLOBIN GLYCOSYLATED A1C: CPT

## 2023-12-16 PROCEDURE — 36415 COLL VENOUS BLD VENIPUNCTURE: CPT

## 2023-12-16 PROCEDURE — 80061 LIPID PANEL: CPT

## 2023-12-17 ENCOUNTER — MYC REFILL (OUTPATIENT)
Dept: FAMILY MEDICINE | Facility: CLINIC | Age: 58
End: 2023-12-17
Payer: COMMERCIAL

## 2023-12-17 DIAGNOSIS — E11.9 TYPE 2 DIABETES MELLITUS WITHOUT COMPLICATION, WITHOUT LONG-TERM CURRENT USE OF INSULIN (H): ICD-10-CM

## 2023-12-19 RX ORDER — METFORMIN HCL 500 MG
TABLET, EXTENDED RELEASE 24 HR ORAL
Qty: 180 TABLET | Refills: 0 | Status: SHIPPED | OUTPATIENT
Start: 2023-12-19 | End: 2024-07-11

## 2024-01-12 ENCOUNTER — OFFICE VISIT (OUTPATIENT)
Dept: NEUROLOGY | Facility: CLINIC | Age: 59
End: 2024-01-12
Payer: COMMERCIAL

## 2024-01-12 VITALS — SYSTOLIC BLOOD PRESSURE: 133 MMHG | DIASTOLIC BLOOD PRESSURE: 83 MMHG | HEART RATE: 102 BPM | OXYGEN SATURATION: 98 %

## 2024-01-12 DIAGNOSIS — R25.1 TREMOR: Primary | ICD-10-CM

## 2024-01-12 PROCEDURE — 99213 OFFICE O/P EST LOW 20 MIN: CPT | Performed by: PSYCHIATRY & NEUROLOGY

## 2024-01-12 RX ORDER — AMANTADINE HYDROCHLORIDE 100 MG/1
100 CAPSULE, GELATIN COATED ORAL 2 TIMES DAILY
COMMUNITY
Start: 2023-12-18 | End: 2024-06-18

## 2024-01-12 NOTE — LETTER
1/12/2024         RE: Faustino Drew  [address] [address]        Dear Colleague,    Thank you for referring your patient, Faustino Drew, to the [facility]. Please see a copy of my visit note below.    ESTABLISHED PATIENT NEUROLOGY NOTE    DATE OF VISIT: 1/12/2024  CLINIC LOCATION: [facility]  MRN: [id number]  PATIENT NAME: Faustino Drew  YOB: 1965    REASON FOR VISIT:   Chief Complaint   Patient presents with     Follow Up     Discuss additional testing options to get diagnosis      SUBJECTIVE:                                                      HISTORY OF PRESENT ILLNESS: Patient is here to follow up regarding left-sided tremor.  The last visit was on 11/20/2023.  At that time we decided to start amantadine.  Please refer to my initial/other prior notes for further information.    Since the last visit, the patient reports tremor improvement after starting amantadine.  He takes 200 mg at bedtime without issues.  He denies interval development of new focal neurological symptoms.  Feels down due to ongoing custody over his child.  Would like to discuss it and asks for written statement about his diagnosis.  EXAM:                                                    Physical Exam:   Vitals: /83 (BP Location: Right arm, Patient Position: Sitting, Cuff Size: Adult Regular)   Pulse 102   SpO2 98%     General: pt is in NAD, cooperative.  Skin: normal turgor, moist mucous membranes, no lesions/rashes noticed.  HEENT: ATNC, white sclera, normal conjunctiva.  Respiratory: Symmetric lung excursion, no accessory respiratory muscle use.  Abdomen: Non distended.  Neurological: awake, cooperative, follows commands, no aphasia or dysarthria noted, has mild anuria resting left hand tremor, tone slightly increases with provoking maneuvers bilaterally, finger tapping and foot tapping is slightly slower on the left.  ASSESSMENT AND PLAN:                                                     Assessment: 58 year old male patient presents for follow-up of left hand/leg resting tremor.  He reports that amantadine is helpful, but he does not take it as prescribed.  I advised the patient to take 100 mg twice daily instead of 200 mg at bedtime to provide consistent effect.  We also discussed the differential regarding his diagnosis, Da[employer]AN option, and I provided him requested note.    Diagnoses:    ICD-10-CM    1. Tremor  R25.1         Plan: At today's visit we thoroughly discussed current symptoms, available treatment options, and the plan.    I advised the patient to take amantadine as prescribed: 100 mg twice daily and let me know about any intolerable side effects.    Requested note was provided.    Next follow-up appointment is 4/30/2024 or sooner if needed.    Total Time: 21 minutes spent on the date of the encounter doing chart review, history and exam, documentation and further activities per the note.    Ho Gibson MD  [facility] Neurology  (Chart documentation was completed in part with Dragon voice-recognition software. Even though reviewed, some grammatical, spelling, and word errors may remain.)      Again, thank you for allowing me to participate in the care of your patient.        Sincerely,        Ho Gibson MD

## 2024-01-12 NOTE — PROGRESS NOTES
ESTABLISHED PATIENT NEUROLOGY NOTE    DATE OF VISIT: 1/12/2024  CLINIC LOCATION: [facility]  MRN: [id number]  PATIENT NAME: Faustino Drew  YOB: 1965    REASON FOR VISIT:   Chief Complaint   Patient presents with    Follow Up     Discuss additional testing options to get diagnosis      SUBJECTIVE:                                                      HISTORY OF PRESENT ILLNESS: Patient is here to follow up regarding left-sided tremor.  The last visit was on 11/20/2023.  At that time we decided to start amantadine.  Please refer to my initial/other prior notes for further information.    Since the last visit, the patient reports tremor improvement after starting amantadine.  He takes 200 mg at bedtime without issues.  He denies interval development of new focal neurological symptoms.  Feels down due to ongoing custody over his child.  Would like to discuss it and asks for written statement about his diagnosis.  EXAM:                                                    Physical Exam:   Vitals: /83 (BP Location: Right arm, Patient Position: Sitting, Cuff Size: Adult Regular)   Pulse 102   SpO2 98%     General: pt is in NAD, cooperative.  Skin: normal turgor, moist mucous membranes, no lesions/rashes noticed.  HEENT: ATNC, white sclera, normal conjunctiva.  Respiratory: Symmetric lung excursion, no accessory respiratory muscle use.  Abdomen: Non distended.  Neurological: awake, cooperative, follows commands, no aphasia or dysarthria noted, has mild resting left hand tremor, tone slightly increases with provoking maneuvers bilaterally, finger tapping and foot tapping is slightly slower on the left.  ASSESSMENT AND PLAN:                                                    Assessment: 58 year old male patient presents for follow-up of left hand/leg resting tremor.  He reports that amantadine is helpful, but he does not take it as prescribed.  I advised the patient to take 100 mg  twice daily instead of 200 mg at bedtime to provide consistent effect.  We also discussed the differential regarding his diagnosis, [facility] option, and I provided him requested note.    Diagnoses:    ICD-10-CM    1. Tremor  R25.1         Plan: At today's visit we thoroughly discussed current symptoms, available treatment options, and the plan.    I advised the patient to take amantadine as prescribed: 100 mg twice daily and let me know about any intolerable side effects.    Requested note was provided.    Next follow-up appointment is 4/30/2024 or sooner if needed.    Total Time: 21 minutes spent on the date of the encounter doing chart review, history and exam, documentation and further activities per the note.    Ho Gibson MD  [facility] Neurology  (Chart documentation was completed in part with Dragon voice-recognition software. Even though reviewed, some grammatical, spelling, and word errors may remain.)

## 2024-01-12 NOTE — PATIENT INSTRUCTIONS
AFTER VISIT SUMMARY (AVS):    At today's visit we thoroughly discussed current symptoms, available treatment options, and the plan.    Please take amantadine as prescribed: 100 mg twice daily and let me know about any intolerable side effects.    Requested note was provided.    Next follow-up appointment is 4/30/2024 or sooner if needed.    Please do not hesitate to call me with any questions or concerns.    Thanks.

## 2024-01-12 NOTE — LETTER
January 12, 2024      Faustino Drew  [address] [address]        To Whom It May Concern,       This patient is under my care for left sided tremor.  We discussed that it could be due to very mild form of Parkinson's disease versus other causes.  If it is due to Parkinson's disease, the current symptoms are very mild and should not affect his ability to take care or his child.  Please contact my clinic with any additional questions or concerns at the provided above phone number.          Sincerely,        Ho Gibson MD

## 2024-01-16 ENCOUNTER — PATIENT OUTREACH (OUTPATIENT)
Dept: CARE COORDINATION | Facility: CLINIC | Age: 59
End: 2024-01-16
Payer: COMMERCIAL

## 2024-01-30 ENCOUNTER — PATIENT OUTREACH (OUTPATIENT)
Dept: CARE COORDINATION | Facility: CLINIC | Age: 59
End: 2024-01-30
Payer: COMMERCIAL

## 2024-04-14 ENCOUNTER — HEALTH MAINTENANCE LETTER (OUTPATIENT)
Age: 59
End: 2024-04-14

## 2024-04-29 ENCOUNTER — TELEPHONE (OUTPATIENT)
Dept: NEUROLOGY | Facility: CLINIC | Age: 59
End: 2024-04-29
Payer: COMMERCIAL

## 2024-04-29 NOTE — TELEPHONE ENCOUNTER
Attempted to reach patient to remind them about appointment scheduled with Ho Gibson MD on 4/30/24 in our [place] location.  A voicemail was left with a call back number if the patient has questions or would like to reschedule.

## 2024-06-18 ENCOUNTER — OFFICE VISIT (OUTPATIENT)
Dept: NEUROLOGY | Facility: CLINIC | Age: 59
End: 2024-06-18
Payer: COMMERCIAL

## 2024-06-18 VITALS — OXYGEN SATURATION: 96 % | SYSTOLIC BLOOD PRESSURE: 131 MMHG | DIASTOLIC BLOOD PRESSURE: 87 MMHG | HEART RATE: 74 BPM

## 2024-06-18 DIAGNOSIS — R25.1 TREMOR: Primary | ICD-10-CM

## 2024-06-18 PROCEDURE — G2211 COMPLEX E/M VISIT ADD ON: HCPCS | Performed by: PSYCHIATRY & NEUROLOGY

## 2024-06-18 PROCEDURE — 99213 OFFICE O/P EST LOW 20 MIN: CPT | Performed by: PSYCHIATRY & NEUROLOGY

## 2024-06-18 RX ORDER — AMANTADINE HYDROCHLORIDE 100 MG/1
100 CAPSULE, GELATIN COATED ORAL 3 TIMES DAILY
Qty: 270 CAPSULE | Refills: 1 | Status: SHIPPED | OUTPATIENT
Start: 2024-06-18

## 2024-06-18 NOTE — PROGRESS NOTES
"Faustino Drew is a 58 year old male who presents for:  Chief Complaint   Patient presents with    Follow Up     Tremors- stable         Initial Vitals:  /87 (BP Location: Left arm, Patient Position: Sitting, Cuff Size: Adult Regular)   Pulse 74   SpO2 96%  Estimated body mass index is 29.01 kg/m  as calculated from the following:    Height as of 2/15/23: 1.803 m (5' 11\").    Weight as of 2/15/23: 94.3 kg (208 lb).. There is no height or weight on file to calculate BSA. BP completed using cuff size: regular    Nadya Fowler   "

## 2024-06-18 NOTE — PATIENT INSTRUCTIONS
AFTER VISIT SUMMARY (AVS):    At today's visit we thoroughly discussed current symptoms, available treatment options, and the plan.    We decided to increase amantadine dose to 100 mg 3 times per day.  Please contact my clinic with any intolerable side effects.  Additional treatment options include trihexyphenidyl and Sinemet (carbidopa/levodopa).  You could review them at home.    Next follow-up appointment is in the next 6 months or earlier if needed.    Please do not hesitate to call me with any questions or concerns.    Thanks.

## 2024-06-18 NOTE — LETTER
6/18/2024      Faustino Drew  [address] [address]      Dear Colleague,    Thank you for referring your patient, Faustino Drew, to the [facility]. Please see a copy of my visit note below.    ESTABLISHED PATIENT NEUROLOGY NOTE    DATE OF VISIT: 6/18/2024  CLINIC LOCATION: [facility]  MRN: [id number]  PATIENT NAME: Faustino Drew  YOB: 1965    REASON FOR VISIT:   Chief Complaint   Patient presents with     Follow Up     Tremors- stable      SUBJECTIVE:                                                      HISTORY OF PRESENT ILLNESS: Patient is here to follow up regarding left-sided tremor.  He was last seen on 1/12/2024.  Please refer to my initial/other prior notes for further information.    Since the last visit, the patient reports mild worsening of his tremor.  He takes amantadine 100 mg twice daily, which is helpful.  No significant side effects or interval development of new neurological symptoms.  He wonders if there are stronger medications available.  He came with his son.  EXAM:                                                    Physical Exam:   Vitals: /87 (BP Location: Left arm, Patient Position: Sitting, Cuff Size: Adult Regular)   Pulse 74   SpO2 96%     General: pt is in NAD, cooperative.  Skin: normal turgor, moist mucous membranes, no lesions/rashes noticed.  HEENT: ATNC, white sclera, normal conjunctiva.  Respiratory: Symmetric lung excursion, no accessory respiratory muscle use.  Abdomen: Non distended.  Neurological: awake, cooperative, follows commands, no exam changes compared to the previous visits.  ASSESSMENT AND PLAN:                                                    Assessment: 58 year old male patient presents for follow-up of left hand/leg resting tremor after adjusting amantadine dose.  He feels that amantadine is helpful, but wonders about additional treatment options.  We discussed that the dose of  "amantadine could be further increased to 3 times per day to see if it provides sustained effect.  Additional treatment options would be trihexyphenidyl and Sinemet.  He will review them at home.  I sent the updated Timentin prescription to the requested pharmacy.    Diagnoses:    ICD-10-CM    1. Tremor  R25.1         Plan: At today's visit we thoroughly discussed current symptoms, available treatment options, and the plan.    We decided to increase amantadine dose to 100 mg 3 times per day.  I advised the patient to contact my clinic with any intolerable side effects.  Additional treatment options include trihexyphenidyl and Sinemet (carbidopa/levodopa).     Next follow-up appointment is in the next 6 months or earlier if needed.    Total Time: 17 minutes spent on the date of the encounter doing chart review, history and exam, documentation and further activities per the note.    Ho Gibson MD  [facility] Neurology  (Chart documentation was completed in part with Dragon voice-recognition software. Even though reviewed, some grammatical, spelling, and word errors may remain.)    Faustino Drew is a 58 year old male who presents for:  Chief Complaint   Patient presents with     Follow Up     Tremors- stable         Initial Vitals:  /87 (BP Location: Left arm, Patient Position: Sitting, Cuff Size: Adult Regular)   Pulse 74   SpO2 96%  Estimated body mass index is 29.01 kg/m  as calculated from the following:    Height as of 2/15/23: 1.803 m (5' 11\").    Weight as of 2/15/23: 94.3 kg (208 lb).. There is no height or weight on file to calculate BSA. BP completed using cuff size: regular    Nadya Fowler       Again, thank you for allowing me to participate in the care of your patient.        Sincerely,        Ho Gibson MD  "

## 2024-06-18 NOTE — PROGRESS NOTES
ESTABLISHED PATIENT NEUROLOGY NOTE    DATE OF VISIT: 6/18/2024  CLINIC LOCATION: [facility]  MRN: [id number]  PATIENT NAME: Faustino Drew  YOB: 1965    REASON FOR VISIT:   Chief Complaint   Patient presents with    Follow Up     Tremors- stable      SUBJECTIVE:                                                      HISTORY OF PRESENT ILLNESS: Patient is here to follow up regarding left-sided tremor.  He was last seen on 1/12/2024.  Please refer to my initial/other prior notes for further information.    Since the last visit, the patient reports mild worsening of his tremor.  He takes amantadine 100 mg twice daily, which is helpful.  No significant side effects or interval development of new neurological symptoms.  He wonders if there are stronger medications available.  He came with his son.  EXAM:                                                    Physical Exam:   Vitals: /87 (BP Location: Left arm, Patient Position: Sitting, Cuff Size: Adult Regular)   Pulse 74   SpO2 96%     General: pt is in NAD, cooperative.  Skin: normal turgor, moist mucous membranes, no lesions/rashes noticed.  HEENT: ATNC, white sclera, normal conjunctiva.  Respiratory: Symmetric lung excursion, no accessory respiratory muscle use.  Abdomen: Non distended.  Neurological: awake, cooperative, follows commands, no exam changes compared to the previous visits.  ASSESSMENT AND PLAN:                                                    Assessment: 58 year old male patient presents for follow-up of left hand/leg resting tremor after adjusting amantadine dose.  He feels that amantadine is helpful, but wonders about additional treatment options.  We discussed that the dose of amantadine could be further increased to 3 times per day to see if it provides sustained effect.  Additional treatment options would be trihexyphenidyl and Sinemet.  He will review them at home.  I sent the updated Timentin prescription  to the requested pharmacy.    Diagnoses:    ICD-10-CM    1. Tremor  R25.1         Plan: At today's visit we thoroughly discussed current symptoms, available treatment options, and the plan.    We decided to increase amantadine dose to 100 mg 3 times per day.  I advised the patient to contact my clinic with any intolerable side effects.  Additional treatment options include trihexyphenidyl and Sinemet (carbidopa/levodopa).     Next follow-up appointment is in the next 6 months or earlier if needed.    Total Time: 17 minutes spent on the date of the encounter doing chart review, history and exam, documentation and further activities per the note.    Ho Gibson MD  [facility] Neurology  (Chart documentation was completed in part with Dragon voice-recognition software. Even though reviewed, some grammatical, spelling, and word errors may remain.)

## 2024-06-27 ENCOUNTER — TRANSFERRED RECORDS (OUTPATIENT)
Dept: MULTI SPECIALTY CLINIC | Facility: CLINIC | Age: 59
End: 2024-06-27
Payer: COMMERCIAL

## 2024-06-27 LAB — RETINOPATHY: NORMAL

## 2024-07-11 ENCOUNTER — OFFICE VISIT (OUTPATIENT)
Dept: FAMILY MEDICINE | Facility: CLINIC | Age: 59
End: 2024-07-11
Payer: COMMERCIAL

## 2024-07-11 ENCOUNTER — MYC REFILL (OUTPATIENT)
Dept: FAMILY MEDICINE | Facility: CLINIC | Age: 59
End: 2024-07-11

## 2024-07-11 VITALS
OXYGEN SATURATION: 96 % | HEART RATE: 74 BPM | HEIGHT: 71 IN | WEIGHT: 215.6 LBS | RESPIRATION RATE: 20 BRPM | TEMPERATURE: 97.1 F | SYSTOLIC BLOOD PRESSURE: 152 MMHG | DIASTOLIC BLOOD PRESSURE: 94 MMHG | BODY MASS INDEX: 30.18 KG/M2

## 2024-07-11 DIAGNOSIS — E11.9 TYPE 2 DIABETES MELLITUS WITHOUT COMPLICATION, WITHOUT LONG-TERM CURRENT USE OF INSULIN (H): Primary | ICD-10-CM

## 2024-07-11 DIAGNOSIS — I10 BENIGN ESSENTIAL HYPERTENSION: ICD-10-CM

## 2024-07-11 DIAGNOSIS — R25.1 TREMOR: ICD-10-CM

## 2024-07-11 DIAGNOSIS — E78.5 HYPERLIPIDEMIA WITH TARGET LDL LESS THAN 130: ICD-10-CM

## 2024-07-11 DIAGNOSIS — E11.9 TYPE 2 DIABETES MELLITUS WITHOUT COMPLICATION, WITHOUT LONG-TERM CURRENT USE OF INSULIN (H): ICD-10-CM

## 2024-07-11 PROBLEM — Z12.5 SCREENING FOR PROSTATE CANCER: Status: ACTIVE | Noted: 2024-07-11

## 2024-07-11 LAB
ANION GAP SERPL CALCULATED.3IONS-SCNC: 9 MMOL/L (ref 7–15)
BUN SERPL-MCNC: 16.6 MG/DL (ref 6–20)
CALCIUM SERPL-MCNC: 9.4 MG/DL (ref 8.6–10)
CHLORIDE SERPL-SCNC: 103 MMOL/L (ref 98–107)
CREAT SERPL-MCNC: 0.96 MG/DL (ref 0.67–1.17)
DEPRECATED HCO3 PLAS-SCNC: 26 MMOL/L (ref 22–29)
EGFRCR SERPLBLD CKD-EPI 2021: >90 ML/MIN/1.73M2
GLUCOSE SERPL-MCNC: 116 MG/DL (ref 70–99)
HBA1C MFR BLD: 6.1 % (ref 0–5.6)
POTASSIUM SERPL-SCNC: 4.3 MMOL/L (ref 3.4–5.3)
SODIUM SERPL-SCNC: 138 MMOL/L (ref 135–145)

## 2024-07-11 PROCEDURE — 83036 HEMOGLOBIN GLYCOSYLATED A1C: CPT | Performed by: FAMILY MEDICINE

## 2024-07-11 PROCEDURE — 80048 BASIC METABOLIC PNL TOTAL CA: CPT | Performed by: FAMILY MEDICINE

## 2024-07-11 PROCEDURE — 36415 COLL VENOUS BLD VENIPUNCTURE: CPT | Performed by: FAMILY MEDICINE

## 2024-07-11 PROCEDURE — 99214 OFFICE O/P EST MOD 30 MIN: CPT | Performed by: FAMILY MEDICINE

## 2024-07-11 RX ORDER — METFORMIN HCL 500 MG
TABLET, EXTENDED RELEASE 24 HR ORAL
Qty: 180 TABLET | Refills: 3 | Status: CANCELLED | OUTPATIENT
Start: 2024-07-11

## 2024-07-11 RX ORDER — METFORMIN HCL 500 MG
TABLET, EXTENDED RELEASE 24 HR ORAL
Qty: 180 TABLET | Refills: 3 | Status: SHIPPED | OUTPATIENT
Start: 2024-07-11

## 2024-07-11 RX ORDER — LISINOPRIL 20 MG/1
20 TABLET ORAL DAILY
Qty: 90 TABLET | Refills: 3 | Status: SHIPPED | OUTPATIENT
Start: 2024-07-11

## 2024-07-11 RX ORDER — LISINOPRIL 20 MG/1
20 TABLET ORAL DAILY
Qty: 90 TABLET | Refills: 3 | Status: CANCELLED | OUTPATIENT
Start: 2024-07-11

## 2024-07-11 ASSESSMENT — PAIN SCALES - GENERAL: PAINLEVEL: NO PAIN (0)

## 2024-07-11 NOTE — PROGRESS NOTES
"S :Faustino Drew is a 58 year old male with DM2.  Metformin.  6.1 A1C.  Doing well    Htn: stopped lisinopril.  \"I thought I didn't need it witht he amantadine\"    Talked about goals, he's willing to restart    Tremor: neuology following.  Was put on amantadine    Hyperlipidemia:  not on statin.  Will need to add    Using nicotine.  Snus.  Getting ready to quit      Current Outpatient Medications   Medication Sig Dispense Refill    amantadine (SYMMETREL) 100 MG capsule Take 1 capsule (100 mg) by mouth 3 times daily 270 capsule 1    lisinopril (ZESTRIL) 20 MG tablet Take 1 tablet (20 mg) by mouth daily 90 tablet 3    metFORMIN (GLUCOPHAGE XR) 500 MG 24 hr tablet Take 2 tablets with dinner 180 tablet 3    multivitamin w/minerals (THERA-VIT-M) tablet Take 1 tablet by mouth daily      CONTOUR NEXT TEST test strip Use to test blood sugar 1-2 times daily. 200 strip 11    IBUPROFEN PO Take 600 mg by mouth 3 times daily      Microlet Lancets MISC 100 each 2 times daily Use to test blood sugar 2x daily. 100 each 11     O:BP (!) 152/94 (BP Location: Right arm, Patient Position: Sitting, Cuff Size: Adult Regular)   Pulse 74   Temp 97.1  F (36.2  C) (Tympanic)   Resp 20   Ht 1.803 m (5' 11\")   Wt 97.8 kg (215 lb 9.6 oz)   SpO2 96%   BMI 30.07 kg/m    GEN: Alert and oriented, in no acute distress  CV: RRR, no murmur  RESP: lungs clear bilaterally, good effort  EXT: no edema or lesions noted in lower extremities      A; DM2, stable      Htn,not controlled        Hyperlipidemia, not on statin        Tremor, on meds, neurology follows    P: back on lisinopril.  Work on getting off nicotine replacement.      Statin discussion next visit      Keep working on wt loss as well.  Back in 6 mo    "

## 2024-08-13 ENCOUNTER — PATIENT OUTREACH (OUTPATIENT)
Dept: CARE COORDINATION | Facility: CLINIC | Age: 59
End: 2024-08-13
Payer: COMMERCIAL

## 2024-11-07 ENCOUNTER — PATIENT OUTREACH (OUTPATIENT)
Dept: CARE COORDINATION | Facility: CLINIC | Age: 59
End: 2024-11-07
Payer: COMMERCIAL

## 2024-11-10 ENCOUNTER — HEALTH MAINTENANCE LETTER (OUTPATIENT)
Age: 59
End: 2024-11-10

## 2024-11-11 ENCOUNTER — TELEPHONE (OUTPATIENT)
Dept: FAMILY MEDICINE | Facility: CLINIC | Age: 59
End: 2024-11-11
Payer: COMMERCIAL

## 2024-11-11 NOTE — TELEPHONE ENCOUNTER
Patient Quality Outreach    Patient is due for the following:   Diabetes -  Diabetic Follow-Up Visit  Hypertension -  Hypertension follow-up visit    Next Steps:   Patient has upcoming appointment, these items will be addressed at that time.    Type of outreach:    Chart review performed, no outreach needed.      Questions for provider review:    None           Isa Mclean MA

## 2025-01-07 ENCOUNTER — MYC REFILL (OUTPATIENT)
Dept: FAMILY MEDICINE | Facility: CLINIC | Age: 60
End: 2025-01-07
Payer: COMMERCIAL

## 2025-01-07 DIAGNOSIS — E11.9 TYPE 2 DIABETES MELLITUS WITHOUT COMPLICATION, WITHOUT LONG-TERM CURRENT USE OF INSULIN (H): ICD-10-CM

## 2025-01-09 RX ORDER — LISINOPRIL 20 MG/1
20 TABLET ORAL DAILY
Qty: 90 TABLET | Refills: 3 | OUTPATIENT
Start: 2025-01-09

## 2025-01-15 ENCOUNTER — OFFICE VISIT (OUTPATIENT)
Dept: FAMILY MEDICINE | Facility: CLINIC | Age: 60
End: 2025-01-15
Attending: FAMILY MEDICINE
Payer: COMMERCIAL

## 2025-01-15 VITALS
BODY MASS INDEX: 29.05 KG/M2 | SYSTOLIC BLOOD PRESSURE: 158 MMHG | HEART RATE: 68 BPM | HEIGHT: 71 IN | TEMPERATURE: 98.6 F | OXYGEN SATURATION: 100 % | WEIGHT: 207.5 LBS | DIASTOLIC BLOOD PRESSURE: 99 MMHG | RESPIRATION RATE: 18 BRPM

## 2025-01-15 DIAGNOSIS — I10 BENIGN ESSENTIAL HYPERTENSION: ICD-10-CM

## 2025-01-15 DIAGNOSIS — E11.9 TYPE 2 DIABETES MELLITUS WITHOUT COMPLICATION, WITHOUT LONG-TERM CURRENT USE OF INSULIN (H): Primary | ICD-10-CM

## 2025-01-15 DIAGNOSIS — E78.5 HYPERLIPIDEMIA WITH TARGET LDL LESS THAN 130: ICD-10-CM

## 2025-01-15 LAB
CHOLEST SERPL-MCNC: 191 MG/DL
CREAT UR-MCNC: 128.5 MG/DL
EST. AVERAGE GLUCOSE BLD GHB EST-MCNC: 131 MG/DL
FASTING STATUS PATIENT QL REPORTED: YES
HBA1C MFR BLD: 6.2 % (ref 0–5.6)
HDLC SERPL-MCNC: 42 MG/DL
LDLC SERPL CALC-MCNC: 114 MG/DL
MICROALBUMIN UR-MCNC: 54.8 MG/L
MICROALBUMIN/CREAT UR: 42.65 MG/G CR (ref 0–17)
NONHDLC SERPL-MCNC: 149 MG/DL
TRIGL SERPL-MCNC: 175 MG/DL

## 2025-01-15 PROCEDURE — 36415 COLL VENOUS BLD VENIPUNCTURE: CPT | Performed by: FAMILY MEDICINE

## 2025-01-15 PROCEDURE — 99214 OFFICE O/P EST MOD 30 MIN: CPT | Performed by: FAMILY MEDICINE

## 2025-01-15 PROCEDURE — 83036 HEMOGLOBIN GLYCOSYLATED A1C: CPT | Performed by: FAMILY MEDICINE

## 2025-01-15 PROCEDURE — 82043 UR ALBUMIN QUANTITATIVE: CPT | Performed by: FAMILY MEDICINE

## 2025-01-15 PROCEDURE — G2211 COMPLEX E/M VISIT ADD ON: HCPCS | Performed by: FAMILY MEDICINE

## 2025-01-15 PROCEDURE — 80061 LIPID PANEL: CPT | Performed by: FAMILY MEDICINE

## 2025-01-15 PROCEDURE — 82570 ASSAY OF URINE CREATININE: CPT | Performed by: FAMILY MEDICINE

## 2025-01-15 RX ORDER — ATORVASTATIN CALCIUM 20 MG/1
20 TABLET, FILM COATED ORAL DAILY
Qty: 90 TABLET | Refills: 1 | Status: SHIPPED | OUTPATIENT
Start: 2025-01-15

## 2025-01-15 RX ORDER — ASPIRIN 81 MG/1
81 TABLET ORAL DAILY
Qty: 30 TABLET | Refills: 0 | Status: SHIPPED | OUTPATIENT
Start: 2025-01-15

## 2025-01-15 RX ORDER — LISINOPRIL AND HYDROCHLOROTHIAZIDE 20; 25 MG/1; MG/1
1 TABLET ORAL DAILY
Qty: 90 TABLET | Refills: 1 | Status: SHIPPED | OUTPATIENT
Start: 2025-01-15

## 2025-01-15 ASSESSMENT — PAIN SCALES - GENERAL: PAINLEVEL_OUTOF10: NO PAIN (0)

## 2025-01-15 NOTE — PROGRESS NOTES
"S :Faustino Drew is a 59 year old male with DM2.  Needs labs updated, has been controlled    Htn: not controlled.  20mg monotherapy with lisinopril.  Willing to add medication    Hyperlipidemia: not on statin, willing to add    Current Outpatient Medications   Medication Sig Dispense Refill    amantadine (SYMMETREL) 100 MG capsule Take 1 capsule (100 mg) by mouth 3 times daily. 270 capsule 1    aspirin 81 MG EC tablet Take 1 tablet (81 mg) by mouth daily. 30 tablet 0    atorvastatin (LIPITOR) 20 MG tablet Take 1 tablet (20 mg) by mouth daily. 90 tablet 1    CONTOUR NEXT TEST test strip Use to test blood sugar 1-2 times daily. 200 strip 11    lisinopril-hydrochlorothiazide (ZESTORETIC) 20-25 MG tablet Take 1 tablet by mouth daily. 90 tablet 1    metFORMIN (GLUCOPHAGE XR) 500 MG 24 hr tablet Take 2 tablets with dinner 180 tablet 3    Microlet Lancets MISC 100 each 2 times daily Use to test blood sugar 2x daily. 100 each 11    multivitamin w/minerals (THERA-VIT-M) tablet Take 1 tablet by mouth daily      IBUPROFEN PO Take 600 mg by mouth 3 times daily       O:BP (!) 158/99   Pulse 68   Temp 98.6  F (37  C) (Tympanic)   Resp 18   Ht 1.803 m (5' 11\")   Wt 94.1 kg (207 lb 8 oz)   SpO2 100%   BMI 28.94 kg/m    GEN: Alert and oriented, in no acute distress  CV: RRR, no murmur  RESP: lungs clear bilaterally, good effort  EXT: no edema or lesions noted in lower extremities    A: DM2.  Needs labs updated  Htn: not controlled.    Hyperlipidemia: not on statin    P: Labs ordered as appropriate for monitoring the listed medical conditions.    Add hydrochlorothiazide, combo pill with ACE  Add statin and baby asa    Future lab for htn, he will get in 3 weeks  We will check in on home BP at that time    Follow-up here in 6 months    The longitudinal plan of care for the diagnosis(es)/condition(s) as documented were addressed during this visit. Due to the added complexity in care, I will continue to support Faustino in the " subsequent management and with ongoing continuity of care.

## 2025-01-27 ENCOUNTER — ALLIED HEALTH/NURSE VISIT (OUTPATIENT)
Dept: FAMILY MEDICINE | Facility: CLINIC | Age: 60
End: 2025-01-27
Payer: COMMERCIAL

## 2025-01-27 ENCOUNTER — TELEPHONE (OUTPATIENT)
Dept: FAMILY MEDICINE | Facility: CLINIC | Age: 60
End: 2025-01-27
Payer: COMMERCIAL

## 2025-01-27 VITALS — SYSTOLIC BLOOD PRESSURE: 130 MMHG | DIASTOLIC BLOOD PRESSURE: 80 MMHG

## 2025-01-27 DIAGNOSIS — I10 BENIGN ESSENTIAL HYPERTENSION: Primary | ICD-10-CM

## 2025-01-27 PROCEDURE — 99207 PR NO CHARGE NURSE ONLY: CPT | Performed by: FAMILY MEDICINE

## 2025-01-27 NOTE — TELEPHONE ENCOUNTER
Patient Quality Outreach    Patient is due for the following:   Hypertension -  BP check    Action(s) Taken:   No follow up needed at this time.      Type of outreach:    Phone, spoke to patient/parent. Checking blood pressure at home last reading 130/80    Questions for provider review:    None           Isa Mclean MA

## 2025-02-10 ENCOUNTER — LAB (OUTPATIENT)
Dept: LAB | Facility: CLINIC | Age: 60
End: 2025-02-10
Payer: COMMERCIAL

## 2025-02-10 DIAGNOSIS — I10 BENIGN ESSENTIAL HYPERTENSION: ICD-10-CM

## 2025-02-10 LAB
ANION GAP SERPL CALCULATED.3IONS-SCNC: 10 MMOL/L (ref 7–15)
BUN SERPL-MCNC: 24.9 MG/DL (ref 6–20)
CALCIUM SERPL-MCNC: 10 MG/DL (ref 8.8–10.4)
CHLORIDE SERPL-SCNC: 100 MMOL/L (ref 98–107)
CREAT SERPL-MCNC: 1.17 MG/DL (ref 0.67–1.17)
EGFRCR SERPLBLD CKD-EPI 2021: 73 ML/MIN/1.73M2
GLUCOSE SERPL-MCNC: 101 MG/DL (ref 70–99)
HCO3 SERPL-SCNC: 28 MMOL/L (ref 22–29)
POTASSIUM SERPL-SCNC: 4.6 MMOL/L (ref 3.4–5.3)
SODIUM SERPL-SCNC: 138 MMOL/L (ref 135–145)

## 2025-02-10 PROCEDURE — 36415 COLL VENOUS BLD VENIPUNCTURE: CPT

## 2025-02-10 PROCEDURE — 80048 BASIC METABOLIC PNL TOTAL CA: CPT

## 2025-04-19 ENCOUNTER — HEALTH MAINTENANCE LETTER (OUTPATIENT)
Age: 60
End: 2025-04-19

## 2025-08-02 ENCOUNTER — HEALTH MAINTENANCE LETTER (OUTPATIENT)
Age: 60
End: 2025-08-02

## 2025-08-11 ENCOUNTER — TELEPHONE (OUTPATIENT)
Dept: FAMILY MEDICINE | Facility: CLINIC | Age: 60
End: 2025-08-11
Payer: COMMERCIAL